# Patient Record
Sex: FEMALE | Race: WHITE | NOT HISPANIC OR LATINO | Employment: OTHER | URBAN - METROPOLITAN AREA
[De-identification: names, ages, dates, MRNs, and addresses within clinical notes are randomized per-mention and may not be internally consistent; named-entity substitution may affect disease eponyms.]

---

## 2023-04-07 LAB
THYROPEROXIDASE AB (IU/ML) IN SER/PLAS: <28 IU/ML
THYROTROPIN (MIU/L) IN SER/PLAS BY DETECTION LIMIT <= 0.05 MIU/L: 3.72 MIU/L (ref 0.44–3.98)
THYROXINE (T4) FREE (NG/DL) IN SER/PLAS: 0.75 NG/DL (ref 0.61–1.12)
TRIIODOTHYRONINE (T3) FREE (PG/ML) IN SER/PLAS: 2.9 PG/ML (ref 2.3–4.2)

## 2023-04-10 LAB
COPPER: 99.6 UG/DL (ref 80–155)
THYROGLOBULIN AB (IU/ML) IN SER/PLAS: <0.9 IU/ML (ref 0–4)
THYROGLOBULIN LC-MS/MS: NORMAL NG/ML (ref 1.3–31.8)
THYROGLOBULIN: 16 NG/ML (ref 1.3–31.8)
ZINC,SERUM OR PLASMA: 88.3 UG/DL (ref 60–120)

## 2023-04-13 LAB — T3 REVERSE: 11.4 NG/DL (ref 9–27)

## 2023-07-19 LAB
ALANINE AMINOTRANSFERASE (SGPT) (U/L) IN SER/PLAS: 14 U/L (ref 7–45)
ALBUMIN (G/DL) IN SER/PLAS: 4.5 G/DL (ref 3.4–5)
ALKALINE PHOSPHATASE (U/L) IN SER/PLAS: 61 U/L (ref 33–110)
ANION GAP IN SER/PLAS: 12 MMOL/L (ref 10–20)
ASPARTATE AMINOTRANSFERASE (SGOT) (U/L) IN SER/PLAS: 15 U/L (ref 9–39)
BASOPHILS (10*3/UL) IN BLOOD BY AUTOMATED COUNT: 0.04 X10E9/L (ref 0–0.1)
BASOPHILS/100 LEUKOCYTES IN BLOOD BY AUTOMATED COUNT: 0.9 % (ref 0–2)
BILIRUBIN TOTAL (MG/DL) IN SER/PLAS: 0.5 MG/DL (ref 0–1.2)
CALCIUM (MG/DL) IN SER/PLAS: 9.3 MG/DL (ref 8.6–10.6)
CARBON DIOXIDE, TOTAL (MMOL/L) IN SER/PLAS: 27 MMOL/L (ref 21–32)
CHLORIDE (MMOL/L) IN SER/PLAS: 107 MMOL/L (ref 98–107)
CREATININE (MG/DL) IN SER/PLAS: 0.81 MG/DL (ref 0.5–1.05)
EOSINOPHILS (10*3/UL) IN BLOOD BY AUTOMATED COUNT: 0.05 X10E9/L (ref 0–0.7)
EOSINOPHILS/100 LEUKOCYTES IN BLOOD BY AUTOMATED COUNT: 1.1 % (ref 0–6)
ERYTHROCYTE DISTRIBUTION WIDTH (RATIO) BY AUTOMATED COUNT: 12.7 % (ref 11.5–14.5)
ERYTHROCYTE MEAN CORPUSCULAR HEMOGLOBIN CONCENTRATION (G/DL) BY AUTOMATED: 33.9 G/DL (ref 32–36)
ERYTHROCYTE MEAN CORPUSCULAR VOLUME (FL) BY AUTOMATED COUNT: 96 FL (ref 80–100)
ERYTHROCYTES (10*6/UL) IN BLOOD BY AUTOMATED COUNT: 4.4 X10E12/L (ref 4–5.2)
GFR FEMALE: 89 ML/MIN/1.73M2
GLUCOSE (MG/DL) IN SER/PLAS: 80 MG/DL (ref 74–99)
HEMATOCRIT (%) IN BLOOD BY AUTOMATED COUNT: 42.2 % (ref 36–46)
HEMOGLOBIN (G/DL) IN BLOOD: 14.3 G/DL (ref 12–16)
IMMATURE GRANULOCYTES/100 LEUKOCYTES IN BLOOD BY AUTOMATED COUNT: 0.4 % (ref 0–0.9)
LEUKOCYTES (10*3/UL) IN BLOOD BY AUTOMATED COUNT: 4.7 X10E9/L (ref 4.4–11.3)
LYMPHOCYTES (10*3/UL) IN BLOOD BY AUTOMATED COUNT: 1.28 X10E9/L (ref 1.2–4.8)
LYMPHOCYTES/100 LEUKOCYTES IN BLOOD BY AUTOMATED COUNT: 27.4 % (ref 13–44)
MONOCYTES (10*3/UL) IN BLOOD BY AUTOMATED COUNT: 0.38 X10E9/L (ref 0.1–1)
MONOCYTES/100 LEUKOCYTES IN BLOOD BY AUTOMATED COUNT: 8.1 % (ref 2–10)
NEUTROPHILS (10*3/UL) IN BLOOD BY AUTOMATED COUNT: 2.9 X10E9/L (ref 1.2–7.7)
NEUTROPHILS/100 LEUKOCYTES IN BLOOD BY AUTOMATED COUNT: 62.1 % (ref 40–80)
NRBC (PER 100 WBCS) BY AUTOMATED COUNT: 0 /100 WBC (ref 0–0)
PLATELETS (10*3/UL) IN BLOOD AUTOMATED COUNT: 237 X10E9/L (ref 150–450)
POTASSIUM (MMOL/L) IN SER/PLAS: 4.7 MMOL/L (ref 3.5–5.3)
PROTEIN TOTAL: 6.5 G/DL (ref 6.4–8.2)
SODIUM (MMOL/L) IN SER/PLAS: 141 MMOL/L (ref 136–145)
THYROTROPIN (MIU/L) IN SER/PLAS BY DETECTION LIMIT <= 0.05 MIU/L: 2.91 MIU/L (ref 0.44–3.98)
THYROXINE (T4) FREE (NG/DL) IN SER/PLAS: 1.05 NG/DL (ref 0.78–1.48)
TRIIODOTHYRONINE (T3) FREE (PG/ML) IN SER/PLAS: 3 PG/ML (ref 2.3–4.2)
UREA NITROGEN (MG/DL) IN SER/PLAS: 12 MG/DL (ref 6–23)

## 2023-07-24 LAB — KIT D816V INTERPRETATION: NOT DETECTED

## 2023-07-25 LAB
T3 REVERSE: 11.1 NG/DL (ref 9–27)
TRYPTASE: 18.9 MCG/L

## 2023-09-27 LAB
CREATININE CONCENTRATION, 24 HR, U: 69 MG/DL
CREATININE, 24 HR, U: 1397 MG/24 H (ref 603–1783)
LEUKOTRIENE E4, U: 96 PG/MG CR
Lab: 24 H
URINE VOLUME: 2025 ML

## 2023-10-02 LAB — TRYPTASE: 20.4 MCG/L

## 2023-10-03 PROBLEM — H04.123 DRY EYE SYNDROME OF BOTH LACRIMAL GLANDS: Status: ACTIVE | Noted: 2023-10-03

## 2023-10-03 PROBLEM — H52.13 MYOPIA OF BOTH EYES: Status: ACTIVE | Noted: 2023-10-03

## 2023-10-03 PROBLEM — H01.006 BLEPHARITIS OF BOTH EYES: Status: ACTIVE | Noted: 2023-10-03

## 2023-10-03 PROBLEM — H01.003 BLEPHARITIS OF BOTH EYES: Status: ACTIVE | Noted: 2023-10-03

## 2023-10-03 PROBLEM — N76.4 VULVAR ABSCESS: Status: ACTIVE | Noted: 2023-10-03

## 2023-10-03 PROBLEM — H16.223 KERATOCONJUNCTIVITIS SICCA OF BOTH EYES NOT DUE TO SJOGREN'S SYNDROME: Status: ACTIVE | Noted: 2023-10-03

## 2023-10-03 RX ORDER — VIT C/E/ZN/COPPR/LUTEIN/ZEAXAN 250MG-90MG
CAPSULE ORAL
COMMUNITY

## 2023-10-03 RX ORDER — IBUPROFEN 200 MG
TABLET ORAL
COMMUNITY
End: 2023-12-27 | Stop reason: ALTCHOICE

## 2023-10-03 RX ORDER — HYDROCODONE BITARTRATE AND ACETAMINOPHEN 5; 300 MG/1; MG/1
1 TABLET ORAL EVERY 6 HOURS PRN
COMMUNITY
Start: 2015-01-13 | End: 2023-11-07 | Stop reason: ALTCHOICE

## 2023-10-03 RX ORDER — EPINEPHRINE 0.15 MG/.15ML
0.15 INJECTION SUBCUTANEOUS AS NEEDED
COMMUNITY
Start: 2022-07-28 | End: 2023-11-07 | Stop reason: SDUPTHER

## 2023-10-04 ENCOUNTER — CLINICAL SUPPORT (OUTPATIENT)
Dept: ALLERGY | Facility: CLINIC | Age: 48
End: 2023-10-04
Payer: COMMERCIAL

## 2023-10-04 DIAGNOSIS — Z91.09 OTHER ALLERGY, OTHER THAN TO MEDICINAL AGENTS: ICD-10-CM

## 2023-10-04 DIAGNOSIS — J30.89 NON-SEASONAL ALLERGIC RHINITIS, UNSPECIFIED TRIGGER: ICD-10-CM

## 2023-10-04 DIAGNOSIS — J30.9 ALLERGIC RHINITIS, UNSPECIFIED SEASONALITY, UNSPECIFIED TRIGGER: ICD-10-CM

## 2023-10-04 DIAGNOSIS — J30.81 ALLERGIC RHINITIS DUE TO ANIMAL (CAT) (DOG) HAIR AND DANDER: ICD-10-CM

## 2023-10-04 PROCEDURE — 95117 IMMUNOTHERAPY INJECTIONS: CPT | Performed by: ALLERGY & IMMUNOLOGY

## 2023-10-09 LAB
MISCELLANEUOUS TEST RESULT: NORMAL
NAME OF SENDOUT TEST: NORMAL

## 2023-10-11 ENCOUNTER — APPOINTMENT (OUTPATIENT)
Dept: ALLERGY | Facility: CLINIC | Age: 48
End: 2023-10-11
Payer: COMMERCIAL

## 2023-10-18 ENCOUNTER — CLINICAL SUPPORT (OUTPATIENT)
Dept: ALLERGY | Facility: CLINIC | Age: 48
End: 2023-10-18
Payer: COMMERCIAL

## 2023-10-18 ENCOUNTER — HOSPITAL ENCOUNTER (EMERGENCY)
Facility: HOSPITAL | Age: 48
Discharge: HOME | End: 2023-10-18
Attending: STUDENT IN AN ORGANIZED HEALTH CARE EDUCATION/TRAINING PROGRAM
Payer: COMMERCIAL

## 2023-10-18 VITALS
TEMPERATURE: 97.9 F | RESPIRATION RATE: 16 BRPM | HEART RATE: 82 BPM | BODY MASS INDEX: 21.7 KG/M2 | HEIGHT: 71 IN | OXYGEN SATURATION: 99 % | WEIGHT: 155 LBS | SYSTOLIC BLOOD PRESSURE: 113 MMHG | DIASTOLIC BLOOD PRESSURE: 87 MMHG

## 2023-10-18 DIAGNOSIS — J30.9 ALLERGIC RHINITIS, UNSPECIFIED SEASONALITY, UNSPECIFIED TRIGGER: ICD-10-CM

## 2023-10-18 DIAGNOSIS — J30.81 ALLERGIC RHINITIS DUE TO ANIMAL (CAT) (DOG) HAIR AND DANDER: ICD-10-CM

## 2023-10-18 DIAGNOSIS — T63.441A ALLERGIC REACTION TO BEE STING: Primary | ICD-10-CM

## 2023-10-18 DIAGNOSIS — J30.89 NON-SEASONAL ALLERGIC RHINITIS, UNSPECIFIED TRIGGER: ICD-10-CM

## 2023-10-18 PROCEDURE — 2500000002 HC RX 250 W HCPCS SELF ADMINISTERED DRUGS (ALT 637 FOR MEDICARE OP, ALT 636 FOR OP/ED): Performed by: HEALTH CARE PROVIDER

## 2023-10-18 PROCEDURE — 96375 TX/PRO/DX INJ NEW DRUG ADDON: CPT

## 2023-10-18 PROCEDURE — 95117 IMMUNOTHERAPY INJECTIONS: CPT | Performed by: ALLERGY & IMMUNOLOGY

## 2023-10-18 PROCEDURE — 96372 THER/PROPH/DIAG INJ SC/IM: CPT

## 2023-10-18 PROCEDURE — 99284 EMERGENCY DEPT VISIT MOD MDM: CPT | Mod: 25 | Performed by: STUDENT IN AN ORGANIZED HEALTH CARE EDUCATION/TRAINING PROGRAM

## 2023-10-18 PROCEDURE — 96374 THER/PROPH/DIAG INJ IV PUSH: CPT

## 2023-10-18 PROCEDURE — 2500000004 HC RX 250 GENERAL PHARMACY W/ HCPCS (ALT 636 FOR OP/ED): Performed by: HEALTH CARE PROVIDER

## 2023-10-18 PROCEDURE — 94640 AIRWAY INHALATION TREATMENT: CPT

## 2023-10-18 PROCEDURE — 2500000004 HC RX 250 GENERAL PHARMACY W/ HCPCS (ALT 636 FOR OP/ED)

## 2023-10-18 RX ORDER — DIPHENHYDRAMINE HYDROCHLORIDE 50 MG/ML
50 INJECTION INTRAMUSCULAR; INTRAVENOUS ONCE
Status: COMPLETED | OUTPATIENT
Start: 2023-10-18 | End: 2023-10-18

## 2023-10-18 RX ORDER — IPRATROPIUM BROMIDE AND ALBUTEROL SULFATE 2.5; .5 MG/3ML; MG/3ML
3 SOLUTION RESPIRATORY (INHALATION) ONCE
Status: COMPLETED | OUTPATIENT
Start: 2023-10-18 | End: 2023-10-18

## 2023-10-18 RX ORDER — PREDNISONE 20 MG/1
40 TABLET ORAL DAILY
Qty: 10 TABLET | Refills: 0 | Status: SHIPPED | OUTPATIENT
Start: 2023-10-18 | End: 2023-10-23

## 2023-10-18 RX ORDER — DIPHENHYDRAMINE HYDROCHLORIDE 50 MG/ML
INJECTION INTRAMUSCULAR; INTRAVENOUS
Status: COMPLETED
Start: 2023-10-18 | End: 2023-10-18

## 2023-10-18 RX ORDER — EPINEPHRINE 1 MG/ML
0.3 INJECTION INTRAMUSCULAR; INTRAVENOUS; SUBCUTANEOUS ONCE
Status: COMPLETED | OUTPATIENT
Start: 2023-10-18 | End: 2023-10-18

## 2023-10-18 RX ORDER — FAMOTIDINE 10 MG/ML
20 INJECTION INTRAVENOUS ONCE
Status: COMPLETED | OUTPATIENT
Start: 2023-10-18 | End: 2023-10-18

## 2023-10-18 RX ADMIN — DIPHENHYDRAMINE HYDROCHLORIDE 50 MG: 50 INJECTION INTRAMUSCULAR; INTRAVENOUS at 15:41

## 2023-10-18 RX ADMIN — IPRATROPIUM BROMIDE AND ALBUTEROL SULFATE 3 ML: .5; 3 SOLUTION RESPIRATORY (INHALATION) at 16:07

## 2023-10-18 RX ADMIN — FAMOTIDINE 20 MG: 10 INJECTION, SOLUTION INTRAVENOUS at 15:40

## 2023-10-18 RX ADMIN — DIPHENHYDRAMINE HYDROCHLORIDE 50 MG: 50 INJECTION, SOLUTION INTRAMUSCULAR; INTRAVENOUS at 15:41

## 2023-10-18 RX ADMIN — EPINEPHRINE 0.3 MG: 1 INJECTION INTRAMUSCULAR; INTRAVENOUS; SUBCUTANEOUS at 16:19

## 2023-10-18 RX ADMIN — METHYLPREDNISOLONE SODIUM SUCCINATE 125 MG: 125 INJECTION, POWDER, FOR SOLUTION INTRAMUSCULAR; INTRAVENOUS at 15:41

## 2023-10-18 ASSESSMENT — PAIN - FUNCTIONAL ASSESSMENT: PAIN_FUNCTIONAL_ASSESSMENT: 0-10

## 2023-10-18 ASSESSMENT — PAIN SCALES - GENERAL: PAINLEVEL_OUTOF10: 0 - NO PAIN

## 2023-10-18 ASSESSMENT — COLUMBIA-SUICIDE SEVERITY RATING SCALE - C-SSRS
1. IN THE PAST MONTH, HAVE YOU WISHED YOU WERE DEAD OR WISHED YOU COULD GO TO SLEEP AND NOT WAKE UP?: NO
2. HAVE YOU ACTUALLY HAD ANY THOUGHTS OF KILLING YOURSELF?: NO
6. HAVE YOU EVER DONE ANYTHING, STARTED TO DO ANYTHING, OR PREPARED TO DO ANYTHING TO END YOUR LIFE?: NO

## 2023-10-18 ASSESSMENT — LIFESTYLE VARIABLES
HAVE PEOPLE ANNOYED YOU BY CRITICIZING YOUR DRINKING: NO
EVER HAD A DRINK FIRST THING IN THE MORNING TO STEADY YOUR NERVES TO GET RID OF A HANGOVER: NO
REASON UNABLE TO ASSESS: YES
HAVE YOU EVER FELT YOU SHOULD CUT DOWN ON YOUR DRINKING: NO
EVER FELT BAD OR GUILTY ABOUT YOUR DRINKING: NO

## 2023-10-18 NOTE — ED PROVIDER NOTES
HPI   Chief Complaint   Patient presents with    Insect Bite     Pt presents to the ER with a bee sting to mouth. Pt has an allergy to bees. PT states that she feels funny. PT has a rash to her face and neck and states that her throat is itchy.        CC: allergic reaction   HPI:   48-year-old female presents ED after she was stung by a bee prior to arrival patient reports prior anaphylactic reaction secondary to bee stings she did not give herself her EpiPen she reports feeling flushed, hot, tingling sensation swelling around the lips and tongue she also notes some tightness sensation in the throat.  She denies any shortness of breath denies any nausea vomiting denies any chest pain    Additional Limitations to History:   External Records Reviewed: I reviewed recent and relevant outside records including   History Obtained From:     Past Medical History: Per HPI  Medications: Reviewed in EMR and with patient  Allergies:  Reviewed in EMR  Past Surgical History:   Social History:     ------------------------------------------------------------------------------------------------------  Physical Exam:  --Vital signs reviewed in nursing triage note, EMR flow sheets, and at patient's bedside  GEN:  A&Ox3, no acute distress, appears comfortable.  Conversational and appropriate.  No confusion or gross mental status changes.  EYES: EOMI, non-injected sclera.  ENT: Moist mucous membranes, no apparent injuries or lesions.   CARDIO: Normal rate and regular rhythm. No murmurs, rubs, or gallops.  2+ equal pulses of the distal extremities.   PULM: Clear to auscultation bilaterally. No rales, rhonchi, or wheezes. Good symmetric chest expansion.  GI: Soft, non-tender, non-distended. No rebound tenderness or guarding.  SKIN: Warm and dry, no rashes or lesions.  MSK: ROM intact the extremities without contractures.   EXT: No peripheral edema, contusions, or wounds.   NEURO: Cranial nerves II-XII grossly intact. Sensation to light  touch intact and equal bilaterally in upper and lower extremities.  Symmetric 5/5 strength in upper and lower extremities.  PSYCH: Appropriate mood and behavior, converses and responds appropriately during exam.  -------------------------------------------------------------------------------------------------------    Medical Decision Making:  Patient seen and evaluated by ED attending. On arrival the patient     Differential Diagnoses Considered:   Chronic Medical Conditions Significantly Affecting Care:   Diagnostic testing considered: [PERC, D-Dimer, PECARN, etc.]    - EKG interpreted by myself normal sinus rhythm ventricular rate 80 OR interval 148 normal QRS duration no prolonged QT/QTc  - I independently interpreted: [CXR, CT, POCUS, etc. including your interpretation]  - Labs notable for     Escalation of Care: Appropriate for   Social Determinants of Health Significantly Affecting Care: [Homelessness, lacking transportation, uninsured, unable to afford medications]  Prescription Drug Consideration: [Antibiotics, antivirals, pain medications, etc.]  Discussion of Management with Other Providers:  I discussed the patient/results with: [admitting team, consultant, radiologist, social work, EPAT, case management, PT/OT, RT, PCP, etc.]      Delio Espinoza PA-C                          [place] Coma Scale Score: 15                  Patient History   Past Medical History:   Diagnosis Date    Allergy status to unspecified drugs, medicaments and biological substances     History of allergy    Other conditions influencing health status     History Of ___ Previous Pregnancies    Ovarian cyst     Ovarian cyst     No past surgical history on file.  Family History   Problem Relation Name Age of Onset    Melanoma Mother      Lymphoma Sister      Lung cancer Maternal Grandmother      Lung cancer Paternal Grandfather       Social History     Tobacco Use    Smoking status: Not on file    Smokeless tobacco: Not on file    Substance Use Topics    Alcohol use: Not on file    Drug use: Not on file       Physical Exam   ED Triage Vitals [10/18/23 1517]   Temp Heart Rate Resp BP   36.7 °C (98.1 °F) 100 21 110/80      SpO2 Temp Source Heart Rate Source Patient Position   100 % Tympanic Monitor Sitting      BP Location FiO2 (%)     Left arm --       Physical Exam    ED Course & MDM   Diagnoses as of 10/18/23 1927   Allergic reaction to bee sting       Medical Decision Making  Patient was given 0.3 mg epinephrine IM, DuoNeb, Benadryl 50 mg, Pepcid, Solu-Medrol, patient was observed for approximately 4 hours and notes feeling moderately better symptoms almost completely resolved she is neurologically intact hemodynamically stable nontoxic        Procedure  Procedures    The patient was seen by the advanced practice provider.  I have personally performed a substantive portion of the encounter.  I have seen and examined the patient; agree with the workup, evaluation, MDM, management and diagnosis.  The care plan has been discussed with the resident/fellow; I have reviewed the resident/fellow’s note and agree with the documented findings with the exception/addition of information listed below.  All notation in this Addendum supersedes information presented by the resident or YAYA as listed above.     Pt presented for evaluation of a bee sting, s/p self administered epipen.  On my exam, the patient appears comfortable, speaking in full sentences without any visible intraoral swelling.  She was given medication for anaphylaxis and monitored in the ED for 4 hours without rebound reaction.  She does mention a history of rebound reaction and knows the signs and symptoms to look out for.  She does have an epipen at home.  She was ultimately discharged in stable condition.         Rosy Wood DO  Emergency Medicine       Rosy Wood DO  11/02/23 1239      Critical care Time 30 min  This critically ill patient continues to be at-risk  for deterioration / failure due to the above  mentioned dysfunctional unstable organ systems.  I have personally identified and managed all critical care issues.  Assessment, impressions and plans are reflected in the note above as well as the orders.  Critical care time is spent at bedside includes review of diagnostic tests, labs, and radiographs, serial assessments and management of hemodynamics, respiratory status, ventilation and coordination of care.       Rosy Wood, DO  11/30/23 0117

## 2023-10-25 ENCOUNTER — APPOINTMENT (OUTPATIENT)
Dept: ALLERGY | Facility: CLINIC | Age: 48
End: 2023-10-25
Payer: COMMERCIAL

## 2023-11-01 ENCOUNTER — APPOINTMENT (OUTPATIENT)
Dept: ALLERGY | Facility: CLINIC | Age: 48
End: 2023-11-01
Payer: COMMERCIAL

## 2023-11-03 DIAGNOSIS — M79.645 FINGER PAIN, LEFT: ICD-10-CM

## 2023-11-06 ENCOUNTER — HOSPITAL ENCOUNTER (OUTPATIENT)
Dept: RADIOLOGY | Facility: HOSPITAL | Age: 48
Discharge: HOME | End: 2023-11-06
Payer: COMMERCIAL

## 2023-11-06 ENCOUNTER — OFFICE VISIT (OUTPATIENT)
Dept: ORTHOPEDIC SURGERY | Facility: CLINIC | Age: 48
End: 2023-11-06
Payer: COMMERCIAL

## 2023-11-06 VITALS — HEIGHT: 71 IN | BODY MASS INDEX: 21.7 KG/M2 | WEIGHT: 155 LBS

## 2023-11-06 DIAGNOSIS — M79.645 FINGER PAIN, LEFT: ICD-10-CM

## 2023-11-06 DIAGNOSIS — S63.639A VOLAR PLATE INJURY OF FINGER, INITIAL ENCOUNTER: Primary | ICD-10-CM

## 2023-11-06 PROCEDURE — 1036F TOBACCO NON-USER: CPT | Performed by: ORTHOPAEDIC SURGERY

## 2023-11-06 PROCEDURE — 99203 OFFICE O/P NEW LOW 30 MIN: CPT | Performed by: ORTHOPAEDIC SURGERY

## 2023-11-06 PROCEDURE — 73140 X-RAY EXAM OF FINGER(S): CPT | Mod: LEFT SIDE | Performed by: STUDENT IN AN ORGANIZED HEALTH CARE EDUCATION/TRAINING PROGRAM

## 2023-11-06 PROCEDURE — 73140 X-RAY EXAM OF FINGER(S): CPT | Mod: LT

## 2023-11-06 ASSESSMENT — PAIN - FUNCTIONAL ASSESSMENT: PAIN_FUNCTIONAL_ASSESSMENT: NO/DENIES PAIN

## 2023-11-07 NOTE — PROGRESS NOTES
History of Present Illness:  Chief Complaint   Patient presents with    Left Hand - Pain     Left ring finger pain        Patient presents today for evaluation of left ring finger PIP joint pain that began after a fall in June 2023.  She braced her fall with her left hand and believes that she hyperextended her ring finger.  She was still able to move the finger, but noted some degree of swelling.  When she had persistent pain after a couple months radiographs were taken of her left hand in August that were read as negative.  She continues to have some aching soreness about the PIP joint as well as some residual swelling.    Past Medical History:   Diagnosis Date    Allergy status to unspecified drugs, medicaments and biological substances     History of allergy    Other conditions influencing health status     History Of ___ Previous Pregnancies    Ovarian cyst     Ovarian cyst       Medication Documentation Review Audit       Reviewed by Justyn Devi MD (Physician) on 11/07/23 at 0658      Medication Order Taking? Sig Documenting Provider Last Dose Status   cholecalciferol (Vitamin D-3) 25 MCG (1000 UT) capsule [id number] No Take by mouth. Todd Lott MD Not Taking Active   cyanocobalamin, vitamin B-12, (VITAMIN B-12 ORAL) [id number] No Take by mouth. Todd Lott MD Not Taking Active   docosahexaenoic acid/epa (FISH OIL ORAL) [id number] No Fish Oil CAPS   Refills: 0       Active Todd Lott MD Not Taking Active   Discontinued 11/07/23 0657   EPINEPHrine 0.3 mg/0.3 mL injection syringe [id number] No Inject into upper leg. Call 911 after use. Todd Lott MD Not Taking Active   Discontinued 11/07/23 0658   lactobacillus acidophilus (Acidophilus) capsule [id number] No Take by mouth. Todd Lott MD Not Taking Active                    Allergies   Allergen Reactions    Bee Venom Protein (Honey Bee) Hives       Social History     Socioeconomic History    Marital status:       Spouse name: Not on file    Number of children: Not on file    Years of education: Not on file    Highest education level: Not on file   Occupational History    Not on file   Tobacco Use    Smoking status: Never    Smokeless tobacco: Never   Vaping Use    Vaping Use: Never used   Substance and Sexual Activity    Alcohol use: Never    Drug use: Never    Sexual activity: Defer   Other Topics Concern    Not on file   Social History Narrative    Not on file     Social Determinants of Health     Financial Resource Strain: Not on file   Food Insecurity: Not on file   Transportation Needs: Not on file   Physical Activity: Not on file   Stress: Not on file   Social Connections: Not on file   Intimate Partner Violence: Not on file   Housing Stability: Not on file       History reviewed. No pertinent surgical history.     Review of Systems   GENERAL: Negative for malaise, significant weight loss, fever  MUSCULOSKELETAL: see HPI  NEURO:  Negative     Physical Examination  Constitutional: Appears well-developed and well-nourished.  Head: Normocephalic and atraumatic.  Eyes: EOMI grossly  Cardiovascular: Intact distal pulses.   Respiratory: Effort normal. No respiratory distress.  Neurologic: Alert and oriented to person, place, and time.  Skin: Skin is warm and dry.  Hematologic / Lymphatic: No lymphedema, lymphangitis.  Psychiatric: normal mood and affect. Behavior is normal.   Musculoskeletal:  Left ring finger: Mild edema about PIP joint.  Positive volar plate test.  No gross instability.  5-105 degrees flexion.  Sensation intact distally.  Capillary refill less than 2 seconds.    Radiographs: Left ring finger radiographs ordered and available for my review/interpretation: No fracture/dislocation.  Joint spaces maintained.  Review of previous left hand radiographs from August 2, 2023 demonstrates slight irregularity about volar plate insertion of ring finger.     Assessment:  Patient with likely left ring finger  volar plate injury with some residual swelling/pain     Plan:  Nature of the diagnosis was discussed with the patient.  We discussed typical recovery course as well as the fact that she may have long-term PIP joint fullness relative to other joints.  Recommend continued mobilization.  We reviewed a terminal extension exercises that she can do to help with the last few degrees of extension.  She will return if any persistent issues or concerns.  Could also consider therapy referral if she remains symptomatic.

## 2023-11-08 ENCOUNTER — APPOINTMENT (OUTPATIENT)
Dept: ALLERGY | Facility: CLINIC | Age: 48
End: 2023-11-08
Payer: COMMERCIAL

## 2023-11-09 ENCOUNTER — PATIENT OUTREACH (OUTPATIENT)
Dept: HEMATOLOGY/ONCOLOGY | Facility: HOSPITAL | Age: 48
End: 2023-11-09
Payer: COMMERCIAL

## 2023-11-09 NOTE — PROGRESS NOTES
RN talked to patient. She said that she was referred by Dr. Vanessa (allergist) for elevated tryptase level. Patient instructed on date, time and place of appointment. Understanding voiced. Patient to call for any questions. Call back instructions reviewed.

## 2023-11-14 ENCOUNTER — APPOINTMENT (OUTPATIENT)
Dept: OPHTHALMOLOGY | Facility: CLINIC | Age: 48
End: 2023-11-14
Payer: COMMERCIAL

## 2023-11-15 ENCOUNTER — APPOINTMENT (OUTPATIENT)
Dept: ALLERGY | Facility: CLINIC | Age: 48
End: 2023-11-15
Payer: COMMERCIAL

## 2023-11-15 ENCOUNTER — OFFICE VISIT (OUTPATIENT)
Dept: ALLERGY | Facility: CLINIC | Age: 48
End: 2023-11-15
Payer: COMMERCIAL

## 2023-11-15 VITALS
HEART RATE: 89 BPM | OXYGEN SATURATION: 98 % | HEIGHT: 71 IN | SYSTOLIC BLOOD PRESSURE: 112 MMHG | BODY MASS INDEX: 22.54 KG/M2 | WEIGHT: 161 LBS | DIASTOLIC BLOOD PRESSURE: 74 MMHG

## 2023-11-15 DIAGNOSIS — T63.91XA TOXIC EFFECT OF VENOM, ACCIDENTAL OR UNINTENTIONAL, INITIAL ENCOUNTER: ICD-10-CM

## 2023-11-15 DIAGNOSIS — J30.9 ALLERGIC RHINITIS, UNSPECIFIED SEASONALITY, UNSPECIFIED TRIGGER: ICD-10-CM

## 2023-11-15 DIAGNOSIS — J30.81 ALLERGIC RHINITIS DUE TO ANIMAL (CAT) (DOG) HAIR AND DANDER: Primary | ICD-10-CM

## 2023-11-15 PROCEDURE — 95117 IMMUNOTHERAPY INJECTIONS: CPT | Performed by: ALLERGY & IMMUNOLOGY

## 2023-11-15 PROCEDURE — 99214 OFFICE O/P EST MOD 30 MIN: CPT | Performed by: ALLERGY & IMMUNOLOGY

## 2023-11-15 PROCEDURE — 1036F TOBACCO NON-USER: CPT | Performed by: ALLERGY & IMMUNOLOGY

## 2023-11-15 NOTE — PROGRESS NOTES
"Subjective   Patient ID:   [id number]   Qi Pearce is a 48 y.o. female who presents for Allergies (Patient has been sick and she got sting by a yellow jacket having concerns about getting allergy shot).    Chief Complaint   Patient presents with    Allergies     Patient has been sick and she got sting by a yellow jacket having concerns about getting allergy shot          HPI  This patient is here to evaluate for:    10/18/23 - drinking  She was gardening  Stung on the tongue  Had hot chocolate with spices. She took a drink and got stung.   Had swelling. Flushing.   Went to the ED  Had her epipen but gets disoriented.    Gave her benadryl, prednisone, and did give her epinephrine  Benadryl, famotidine, medrol 3:41pm  [place] throat closing so Epi 4:19pm    As soon as she got epinephrine, she felt better.     Then she got a virus  Covid/mono/flu negative.     She is still exhausted   Had a sore throat, sick contact with her daughter. Started 3 weeks ago.  Sinus cough, congestion. Reminded her of when she had covid in the past.   Improving.  Dr. Oconnor in Dr. Cline's office; they had suggested abx but discussed with Dr. Asia Morgan. They decided to avoid abx and use:  Steroid inhaler given, budesonide for the nebulizer and flonase.     When she gets allergy shots, has swelling    She feels like she's becoming more allergic     And headaches.        SH: has a 11yo daughter    Review of Systems   All other systems reviewed and are negative.      Objective     /74   Pulse 89   Ht 1.803 m (5' 11\")   Wt 73 kg (161 lb)   SpO2 98%   BMI 22.45 kg/m²      Physical Exam  Vitals and nursing note reviewed.   Constitutional:       Appearance: Normal appearance. She is normal weight.   HENT:      Head: Normocephalic and atraumatic.      Right Ear: External ear normal.      Left Ear: External ear normal.      Nose: No septal deviation, congestion or rhinorrhea.      Right Turbinates: Enlarged and swollen. Not pale.    "   Left Turbinates: Not enlarged, swollen or pale.      Comments: Mild clear mucus on right but no purulence seen;   Mouth - no erythema or drainage     Mouth/Throat:      Mouth: Mucous membranes are moist.   Eyes:      Extraocular Movements: Extraocular movements intact.      Conjunctiva/sclera: Conjunctivae normal.      Pupils: Pupils are equal, round, and reactive to light.   Cardiovascular:      Rate and Rhythm: Normal rate and regular rhythm.      Pulses: Normal pulses.      Heart sounds: Normal heart sounds.   Pulmonary:      Effort: Pulmonary effort is normal.      Breath sounds: Normal breath sounds. No wheezing, rhonchi or rales.   Musculoskeletal:         General: Normal range of motion.   Skin:     General: Skin is warm and dry.      Findings: No erythema or rash.   Neurological:      General: No focal deficit present.      Mental Status: She is alert and oriented to person, place, and time.   Psychiatric:         Mood and Affect: Mood normal.         Behavior: Behavior normal.            Current Outpatient Medications   Medication Sig Dispense Refill    cholecalciferol (Vitamin D-3) 25 MCG (1000 UT) capsule Take by mouth.      cyanocobalamin, vitamin B-12, (VITAMIN B-12 ORAL) Take by mouth.      docosahexaenoic acid/epa (FISH OIL ORAL) Fish Oil CAPS   Refills: 0       Active      EPINEPHrine 0.3 mg/0.3 mL injection syringe Inject into upper leg. Call 911 after use.      lactobacillus acidophilus (Acidophilus) capsule Take by mouth.       No current facility-administered medications for this visit.       Assessment/Plan   Diagnoses and all orders for this visit:  Allergic rhinitis due to animal (cat) (dog) hair and dander  Allergic rhinitis, unspecified seasonality, unspecified trigger  Toxic effect of venom, accidental or unintentional, initial encounter      Will avoid environmental and venom shots on same day  So for 3 weeks in a row, doing environmentals   4th week - venom  Repeat    We discussed  whether to INCREASE the venom dosing due to her recent reaction. In shared decision making, since she just recently got to maintenance and the sting was located on her tongue (very vascular and associated with throat angioedema); for now will continue the current 1ml dosing. Will reassess going forward.     Follow-up in 3 months so we may re-assess you and develop a more long term plan.     Allergy shot today given for enviornmental; next week will do the 3 venoms (mv,w,hb)    Await heme/onc recommendations - seeing Dr. Archer on 11/20/23    Naren Vanessa MD

## 2023-11-20 ENCOUNTER — OFFICE VISIT (OUTPATIENT)
Dept: HEMATOLOGY/ONCOLOGY | Facility: HOSPITAL | Age: 48
End: 2023-11-20
Payer: COMMERCIAL

## 2023-11-20 ENCOUNTER — LAB (OUTPATIENT)
Dept: LAB | Facility: HOSPITAL | Age: 48
End: 2023-11-20
Payer: COMMERCIAL

## 2023-11-20 ENCOUNTER — OFFICE VISIT (OUTPATIENT)
Dept: OPHTHALMOLOGY | Facility: CLINIC | Age: 48
End: 2023-11-20
Payer: COMMERCIAL

## 2023-11-20 VITALS
HEART RATE: 84 BPM | OXYGEN SATURATION: 100 % | HEIGHT: 70 IN | RESPIRATION RATE: 17 BRPM | TEMPERATURE: 97.5 F | SYSTOLIC BLOOD PRESSURE: 110 MMHG | WEIGHT: 161.1 LBS | DIASTOLIC BLOOD PRESSURE: 73 MMHG | BODY MASS INDEX: 23.06 KG/M2

## 2023-11-20 DIAGNOSIS — R74.8 ELEVATED SERUM TRYPTASE: ICD-10-CM

## 2023-11-20 DIAGNOSIS — R74.8 ELEVATED SERUM TRYPTASE: Primary | ICD-10-CM

## 2023-11-20 DIAGNOSIS — H53.8 BLURRY VISION: Primary | ICD-10-CM

## 2023-11-20 DIAGNOSIS — H53.10 SUBJECTIVE VISUAL DISTURBANCE: ICD-10-CM

## 2023-11-20 LAB
AVERAGE RNFL BASELINE (OD): 79
AVERAGE RNFL BASELINE (OS): 78
BASOPHILS # BLD AUTO: 0.03 X10*3/UL (ref 0–0.1)
BASOPHILS NFR BLD AUTO: 0.4 %
EOSINOPHIL # BLD AUTO: 0 X10*3/UL (ref 0–0.7)
EOSINOPHIL NFR BLD AUTO: 0 %
ERYTHROCYTE [DISTWIDTH] IN BLOOD BY AUTOMATED COUNT: 12.5 % (ref 11.5–14.5)
HCT VFR BLD AUTO: 42.1 % (ref 36–46)
HGB BLD-MCNC: 14.6 G/DL (ref 12–16)
HOLD SPECIMEN: NORMAL
IMM GRANULOCYTES # BLD AUTO: 0.03 X10*3/UL (ref 0–0.7)
IMM GRANULOCYTES NFR BLD AUTO: 0.4 % (ref 0–0.9)
LYMPHOCYTES # BLD AUTO: 1.46 X10*3/UL (ref 1.2–4.8)
LYMPHOCYTES NFR BLD AUTO: 19.9 %
MCH RBC QN AUTO: 31.9 PG (ref 26–34)
MCHC RBC AUTO-ENTMCNC: 34.7 G/DL (ref 32–36)
MCV RBC AUTO: 92 FL (ref 80–100)
MONOCYTES # BLD AUTO: 0.45 X10*3/UL (ref 0.1–1)
MONOCYTES NFR BLD AUTO: 6.1 %
NEUTROPHILS # BLD AUTO: 5.35 X10*3/UL (ref 1.2–7.7)
NEUTROPHILS NFR BLD AUTO: 73.2 %
NRBC BLD-RTO: 0 /100 WBCS (ref 0–0)
PLATELET # BLD AUTO: 309 X10*3/UL (ref 150–450)
RBC # BLD AUTO: 4.58 X10*6/UL (ref 4–5.2)
WBC # BLD AUTO: 7.3 X10*3/UL (ref 4.4–11.3)

## 2023-11-20 PROCEDURE — 36415 COLL VENOUS BLD VENIPUNCTURE: CPT

## 2023-11-20 PROCEDURE — 83520 IMMUNOASSAY QUANT NOS NONAB: CPT

## 2023-11-20 PROCEDURE — 1036F TOBACCO NON-USER: CPT | Performed by: STUDENT IN AN ORGANIZED HEALTH CARE EDUCATION/TRAINING PROGRAM

## 2023-11-20 PROCEDURE — 92133 CPTRZD OPH DX IMG PST SGM ON: CPT | Performed by: OPTOMETRIST

## 2023-11-20 PROCEDURE — 99205 OFFICE O/P NEW HI 60 MIN: CPT | Performed by: STUDENT IN AN ORGANIZED HEALTH CARE EDUCATION/TRAINING PROGRAM

## 2023-11-20 PROCEDURE — 99215 OFFICE O/P EST HI 40 MIN: CPT | Performed by: STUDENT IN AN ORGANIZED HEALTH CARE EDUCATION/TRAINING PROGRAM

## 2023-11-20 PROCEDURE — 99213 OFFICE O/P EST LOW 20 MIN: CPT | Performed by: OPTOMETRIST

## 2023-11-20 PROCEDURE — 85025 COMPLETE CBC W/AUTO DIFF WBC: CPT

## 2023-11-20 ASSESSMENT — REFRACTION_CURRENTRX
OD_ADDL_SPECS: 02/2022
OS_DIAMETER: 9.0
OD_BRAND: ESSILOR BITORIC
OS_BRAND: ESSILOR BITORIC
OD_DIAMETER: 9.0

## 2023-11-20 ASSESSMENT — ENCOUNTER SYMPTOMS
ENDOCRINE NEGATIVE: 0
MUSCULOSKELETAL NEGATIVE: 0
LOSS OF SENSATION IN FEET: 0
NEUROLOGICAL NEGATIVE: 0
CARDIOVASCULAR NEGATIVE: 0
PSYCHIATRIC NEGATIVE: 0
ALLERGIC/IMMUNOLOGIC NEGATIVE: 0
GASTROINTESTINAL NEGATIVE: 0
HEMATOLOGIC/LYMPHATIC NEGATIVE: 0
DEPRESSION: 0
EYES NEGATIVE: 0
CONSTITUTIONAL NEGATIVE: 0
RESPIRATORY NEGATIVE: 0
OCCASIONAL FEELINGS OF UNSTEADINESS: 0

## 2023-11-20 ASSESSMENT — SLIT LAMP EXAM - LIDS
COMMENTS: NORMAL
COMMENTS: NORMAL

## 2023-11-20 ASSESSMENT — CUP TO DISC RATIO
OD_RATIO: 0.2
OS_RATIO: 0.2

## 2023-11-20 ASSESSMENT — EXTERNAL EXAM - RIGHT EYE: OD_EXAM: NORMAL

## 2023-11-20 ASSESSMENT — EXTERNAL EXAM - LEFT EYE: OS_EXAM: NORMAL

## 2023-11-20 ASSESSMENT — VISUAL ACUITY
OS_CC: 20/20
METHOD: SNELLEN - LINEAR
OS_CC+: +2
OD_CC: J1
OD_CC: 20/20
OS_CC: J1
VA_OR_OS_CURRENT_RX: 20/15
CORRECTION_TYPE: CONTACTS
OD_CC+: +2
VA_OR_OD_CURRENT_RX: 20/15

## 2023-11-20 ASSESSMENT — PAIN SCALES - GENERAL: PAINLEVEL: 0-NO PAIN

## 2023-11-20 ASSESSMENT — TONOMETRY
IOP_METHOD: TONOPEN
OD_IOP_MMHG: 11
OS_IOP_MMHG: 12

## 2023-11-20 NOTE — LETTER
November 20, 2023    Myrtle Trejo MD  [address] [facility] [address]     Patient: Qi Pearce   YOB: 1975   Date of Visit: 11/20/2023       Dear Dr. Myrtle Trejo MD:    Thank you for referring Qi Pearce to me for evaluation. Here is my assessment and plan of care:    Blurry vision  -     OCT, Optic Nerve - OU - Both Eyes  Subjective visual disturbance  Discussed. No evidence of optic disc edema OD, OS on exam or OCT RNFL. Visual acuity (VA) in habitual CLs is 20/15 and 20/20. CL OD does have a few scratches which may be contributing to subjective complaint of 'blurrier' vision. Rigid gas permeable (RGP) OD is from 02/2022 and OS is from 06/2023.   Contrast sensitivity measured normal OD, OS (was checked post dilation, would check pre-dilation at next).   RTC 6 months for comprehensive exam w/ OCT RNFL and contrast sensitivity.   F/up with PCP and allergy team    Below you will find my full exam findings. If you have questions, please do not hesitate to call me. I look forward to following Qi along with you.         Sincerely,        Vanesa RADHIKA Monique, OD        CC:   No Recipients        Base Eye Exam       Visual Acuity (Snellen - Linear)         Right Left    Dist cc 20/20 +2 20/20 +2    Near cc J1 J1      Correction: Contacts              Tonometry (Tonopen, 8:54 AM)         Right Left    Pressure 11 12              Pupils         Pupils    Right PERRL, No APD    Left PERRL, No APD              Extraocular Movement         Right Left     Full Full              Neuro/Psych       Oriented x3: Yes    Mood/Affect: Normal              Dilation       Both eyes: 1.0% Mydriacyl @ 8:55 AM                  Additional Notes    Contrast sensitivity 20/40 letter  OD:12.5  OS: 16       Slit Lamp and Fundus Exam       External Exam         Right Left    External Normal Normal              Slit Lamp Exam         Right Left    Lids/Lashes Normal Normal    Conjunctiva/Sclera White and  quiet White and quiet    Cornea Clear Clear    Anterior Chamber Deep and quiet Deep and quiet    Iris Round and reactive Round and reactive    Lens Clear Clear              Fundus Exam         Right Left    Vitreous Normal Normal    Disc Normal, distinct margins Normal, distinct margins    C/D Ratio 0.2 0.2    Macula Normal Normal                  Contact Lens Exam       Current Contact Lens Rx         Brand Base Curve Diameter Sphere Addl. Specs Over-Sphere Over-Cyl Over-Dist VA OZD    Right Essilor Bitoric 7.76/7.36 9.0 -4.75/-6.50 02/2022 Proctor Sphere 20/15 8.0    Left Essilor Bitoric 7.48/6.97 9.0 -6.50/-9.75 06/2023 -0.25 Sphere 20/15 8.1        CT PC1 PC2    Right       Left 0.12 8.25 9.25      Comments: Scratch on lens OD, centers well, good edge lift, good NaFl pattern  OS centers well, good edge lift, good NaFl pattern

## 2023-11-20 NOTE — PROGRESS NOTES
Assessment/Plan   Diagnoses and all orders for this visit:  Blurry vision  -     OCT, Optic Nerve - OU - Both Eyes  Subjective visual disturbance  Discussed. No evidence of optic disc edema OD, OS on exam or OCT RNFL. Visual acuity (VA) in habitual CLs is 20/15 and 20/20. CL OD does have a few scratches which may be contributing to subjective complaint of 'blurrier' vision. Rigid gas permeable (RGP) OD is from 02/2022 and OS is from 06/2023.   Contrast sensitivity measured normal OD, OS (was checked post dilation, would check pre-dilation at next).   RTC 6 months for comprehensive exam w/ OCT RNFL and contrast sensitivity.   F/up with PCP and allergy team

## 2023-11-20 NOTE — PROGRESS NOTES
Patient ID: Qi Pearce is a 48 y.o. female.  Referring Physician: No referring provider defined for this encounter.  Primary Care Provider: Myrtle Trejo MD  Visit Type: Initial Visit  Diagnosis: elevated tryptase      Subjective    HPI  48 y.o. female with a PMH significant for ?endometriosis, recurrent sinus infections, ?gluten intolerance which has resolved on gluten exclusion.   Referred for eval of progressively elevated tryptase.     Had an anaphylactic reaction to a bee sting in '22 after which she was seen by allergy for the work up. Work up showed elevated tryptase which has progressively increased now over 20. KIT D816V negative.   Has had seasonal allergies in the past used to be a  with many stings but never had an anaphylactic reaction. Allergies have worsened since the last 2 yrs. Getting desensitization therapy for her anaphylaxis with weekly injections to environmental and bee allergens. During these events also takes Zyrtec, last was 1 week back.   Notes increased frequency of infections since the bee sting. None severe.     Notes fatigue, no B s/s. No new rash. No GI or  complaints.   Also got stung by a yellow jacket in October '23, on her tongue, notes confusion during these events, did not use her epipen immediately. Used IV steroids, benadryl and pepcid, then took epipen.   Other meds include quercitin, stinging keyur, vit B, D, omega 3 and magnesium.   Age appropriate cancer screening: last colo '21 was WNL follow up in 10yrs, mammo '23, pap '23.   FMH: identical twin sister had NHL age 20, she also has URI allergy like symptoms, father had small cell of unknown origin, small cell pat GM, mat GF lung cancer.   Social history: never smoker, no ETOH, no RDA.     Review of Systems - Oncology  10 point review of systems negative except as stated in HPI    Objective   Active Ambulatory Problems     Diagnosis Date Noted    Basal cell carcinoma of skin of unspecified parts  "of face 02/23/2016    Blepharitis of both eyes 10/03/2023    Dry eye syndrome of both lacrimal glands 10/03/2023    Myopia of both eyes 10/03/2023    Keratoconjunctivitis sicca of both eyes not due to Sjogren's syndrome 10/03/2023    Vulvar abscess 10/03/2023     Resolved Ambulatory Problems     Diagnosis Date Noted    No Resolved Ambulatory Problems     Past Medical History:   Diagnosis Date    Allergy status to unspecified drugs, medicaments and biological substances     Other conditions influencing health status     Ovarian cyst      No past surgical history on file.    Family History   Problem Relation Name Age of Onset    Melanoma Mother      Lymphoma Sister      Lung cancer Maternal Grandmother      Lung cancer Paternal Grandfather       Oncology History    No history exists.       Physical Exam  Vitals reviewed.   Constitutional:       General: She is not in acute distress.     Appearance: Normal appearance. She is not toxic-appearing.   HENT:      Head: Normocephalic and atraumatic.   Eyes:      Comments: No pallor or icterus    Neck:      Comments: No cervical or axillary lymphadenopathy   Cardiovascular:      Rate and Rhythm: Normal rate and regular rhythm.      Heart sounds: No murmur heard.  Pulmonary:      Effort: Pulmonary effort is normal.      Breath sounds: Normal breath sounds.   Abdominal:      General: There is no distension.      Palpations: Abdomen is soft.      Tenderness: There is no abdominal tenderness.      Comments: No palpable hepatosplenomegaly    Musculoskeletal:      Comments: No pedal edema    Skin:     Comments: Has erythema over face, apparently this is her baseline with rosacea   Neurological:      General: No focal deficit present.      Mental Status: She is alert and oriented to person, place, and time.   Psychiatric:         Mood and Affect: Mood normal.       BSA: 1.9 meters squared  /73   Pulse 84   Temp 36.4 °C (97.5 °F)   Resp 17   Ht (S) 1.784 m (5' 10.24\")   Wt " "73.1 kg (161 lb 1.6 oz)   SpO2 100%   BMI 22.96 kg/m²     Labs:  Lab Results   Component Value Date    WBC 4.7 07/19/2023    NEUTROABS 2.90 07/19/2023    IGABSOL 0.01 12/24/2020    LYMPHSABS 1.28 07/19/2023    MONOSABS 0.38 07/19/2023    EOSABS 0.05 07/19/2023    BASOSABS 0.04 07/19/2023    RBC 4.40 07/19/2023    MCV 96 07/19/2023    MCHC 33.9 07/19/2023    HGB 14.3 07/19/2023    HCT 42.2 07/19/2023     07/19/2023     No results found for: \"RETICCTPCT\"   Lab Results   Component Value Date    CREATININE 0.81 07/19/2023    BUN 12 07/19/2023    EGFR 96 12/24/2020     07/19/2023    K 4.7 07/19/2023     07/19/2023    CO2 27 07/19/2023      Lab Results   Component Value Date    ALT 14 07/19/2023    AST 15 07/19/2023    ALKPHOS 61 07/19/2023    BILITOT 0.5 07/19/2023      Lab Results   Component Value Date    TSH 2.91 07/19/2023     Lab Results   Component Value Date    TSH 2.91 07/19/2023    THYROIDPAB <28 04/07/2023     No results found for: \"IRON\", \"TIBC\", \"FERRITIN\"   No results found for: \"XGCXIKBL55\"   No results found for: \"FOLATE\"  No results found for: \"SHEILA\", \"RF\", \"SEDRATE\"   No results found for: \"CRP\"   No results found for: \"JERALD\"  No results found for: \"LDH\"  No results found for: \"HAPTOGLOBIN\"  No results found for: \"SPEP\"  No results found for: \"IGG\", \"IGM\", \"IGA\"     No components found for: \"PT\"  No results found for: \"APTT\"    Assessment/Plan    48 y.o. female with a PMH significant for rosacea, ?endometriosis, recurrent sinus infections, ?gluten intolerance which has resolved on gluten exclusion.   Referred for eval of progressively elevated tryptase.    Briefly her recent bout of medical issues started with an anaphylactic reaction to a bee sting, after which she started desensitization therapy and most recently was stung in the mouth by a yellow jacket. The pt has had progressively elevated tryptase (now >20), notably with repeated exposure and while being symptomatic from her " allergies with KIT D816V negative.   Labs from our visit show a decreased tryptase to ~15. With a normal CBC, exam also not suggestive of organomegaly.   As discussed with pt, the next step as relates to ruling out mastocytosis would be to proceed with bone marrow biopsy, her negative KIT mutation and decreasing tryptase make this less likely but do not necessarily exclude this. It is possible her tryptase is elevated from repeated exposure to allergen alone as this has never been documented at baseline.   Pt is not enthusiastic to proceed with biopsy at this time favors monitoring, which is reasonable.   Plan:   - 3 month follow up with CBC/diff and tryptase level, preferably with a gap from last allergen exposure.     Jose Song MD

## 2023-11-22 ENCOUNTER — APPOINTMENT (OUTPATIENT)
Dept: ALLERGY | Facility: CLINIC | Age: 48
End: 2023-11-22
Payer: COMMERCIAL

## 2023-11-22 LAB — TRYPTASE SERPL-MCNC: 15.3 UG/L

## 2023-11-29 ENCOUNTER — APPOINTMENT (OUTPATIENT)
Dept: ALLERGY | Facility: CLINIC | Age: 48
End: 2023-11-29
Payer: COMMERCIAL

## 2023-11-29 ENCOUNTER — OFFICE VISIT (OUTPATIENT)
Dept: ALLERGY | Facility: CLINIC | Age: 48
End: 2023-11-29
Payer: COMMERCIAL

## 2023-11-29 VITALS
HEART RATE: 79 BPM | WEIGHT: 161 LBS | HEIGHT: 70 IN | BODY MASS INDEX: 23.05 KG/M2 | DIASTOLIC BLOOD PRESSURE: 75 MMHG | OXYGEN SATURATION: 98 % | SYSTOLIC BLOOD PRESSURE: 116 MMHG

## 2023-11-29 DIAGNOSIS — T78.2XXA ANAPHYLAXIS, INITIAL ENCOUNTER: ICD-10-CM

## 2023-11-29 DIAGNOSIS — J30.81 ALLERGIC RHINITIS DUE TO ANIMAL (CAT) (DOG) HAIR AND DANDER: ICD-10-CM

## 2023-11-29 DIAGNOSIS — J30.9 ALLERGIC RHINITIS, UNSPECIFIED SEASONALITY, UNSPECIFIED TRIGGER: ICD-10-CM

## 2023-11-29 DIAGNOSIS — T63.91XA TOXIC EFFECT OF VENOM, ACCIDENTAL OR UNINTENTIONAL, INITIAL ENCOUNTER: Primary | ICD-10-CM

## 2023-11-29 PROCEDURE — 1036F TOBACCO NON-USER: CPT | Performed by: ALLERGY & IMMUNOLOGY

## 2023-11-29 PROCEDURE — 99214 OFFICE O/P EST MOD 30 MIN: CPT | Performed by: ALLERGY & IMMUNOLOGY

## 2023-11-29 RX ORDER — EPINEPHRINE 0.3 MG/.3ML
0.3 INJECTION SUBCUTANEOUS AS NEEDED
Qty: 2 EACH | Refills: 3 | Status: SHIPPED | OUTPATIENT
Start: 2023-11-29

## 2023-11-29 NOTE — PROGRESS NOTES
"Subjective   Patient ID:   [id number]   Qi Pearce is a 48 y.o. female who presents for Follow-up (Had reaction to last injection).    Chief Complaint   Patient presents with    Follow-up     Had reaction to last injection          HPI  This patient is here to evaluate for:    Reactions after her shots:  3 hrs after shots, she notices headache , itchy eyes,   Large locals; no systemic rash/hives.  Wasn't feeling well so did not come in last week.  She is just feeling better so wants to hold off on her allergy shots.     Currently, she is on venom 1ml maintenance (for the past 3-4 months) and in upper range of the green vials (environmental)    In general, during the summer, she reported feeling foggy headed, fatigue and Lower heat tolerance. Attributes this to allergy.  She is not working now, as a , due to the weather, and doesn't have these sxs anymore.    She saw Dr. Song in heme-onc and I reviewed the note on 11/20/23  They are considering a bone marrow biopsy. There was concern that her elevated tryptase was related to allergy.    Has sizeable swelling, exhausted, wiped out.    Of note, the last tryptase was 11/20/23 and this was 5 days AFTER her allergy shots; the shot should not in any way affect the tryptase level.       Review of Systems   All other systems reviewed and are negative.        Objective     /75   Pulse 79   Ht 1.784 m (5' 10.24\")   Wt 73 kg (161 lb)   SpO2 98%   BMI 22.94 kg/m²      Physical Exam  Vitals and nursing note reviewed.   Constitutional:       Appearance: Normal appearance. She is normal weight.   HENT:      Head: Normocephalic and atraumatic.      Right Ear: External ear normal.      Left Ear: External ear normal.      Nose: No septal deviation, congestion or rhinorrhea.      Right Turbinates: Not enlarged, swollen or pale.      Left Turbinates: Not enlarged, swollen or pale.      Mouth/Throat:      Mouth: Mucous membranes are moist.   Eyes:      Extraocular " "Movements: Extraocular movements intact.      Conjunctiva/sclera: Conjunctivae normal.      Pupils: Pupils are equal, round, and reactive to light.   Cardiovascular:      Rate and Rhythm: Normal rate and regular rhythm.      Pulses: Normal pulses.      Heart sounds: Normal heart sounds.   Pulmonary:      Effort: Pulmonary effort is normal.      Breath sounds: Normal breath sounds. No wheezing, rhonchi or rales.   Musculoskeletal:         General: Normal range of motion.   Skin:     General: Skin is warm and dry.      Findings: No erythema or rash.   Neurological:      General: No focal deficit present.      Mental Status: She is alert and oriented to person, place, and time.   Psychiatric:         Mood and Affect: Mood normal.         Behavior: Behavior normal.          Current Outpatient Medications   Medication Sig Dispense Refill    cholecalciferol (Vitamin D-3) 25 MCG (1000 UT) capsule Take by mouth.      cyanocobalamin, vitamin B-12, (VITAMIN B-12 ORAL) Take by mouth.      docosahexaenoic acid/epa (FISH OIL ORAL) Fish Oil CAPS   Refills: 0       Active      EPINEPHrine 0.3 mg/0.3 mL injection syringe Inject into upper leg. Call 911 after use.      lactobacillus acidophilus (Acidophilus) capsule Take by mouth.      multivitamin capsule Take 1 capsule by mouth once daily.       No current facility-administered medications for this visit.        No components found for: \"LASTLABSF\"       Assessment/Plan   Diagnoses and all orders for this visit:  Toxic effect of venom, accidental or unintentional, initial encounter  -     Tryptase; Future  -     Tryptase; Future  Allergic rhinitis, unspecified seasonality, unspecified trigger  Allergic rhinitis due to animal (cat) (dog) hair and dander  Anaphylaxis, initial encounter  -     Tryptase; Future  -     Tryptase; Future        Next week, will administer venom (but will need to reduce to 0.8ml)  She prefers to avoid any allergy shots today.   Next week: draw tryptase lab " - to be scientific, this will be a good baseline level, far away from any allergy shots or pollen exposure.  Venom shot 0.8ml for MV/W/HB (it will be 7 weeks from last venom so need to reduce dose)  Check tryptase level 1 hour afterwards to determine if any mast cell tryptase/histamine release.    The following week, decreased environmental shot  The following week, maintenance venom shot.  See allergy shot sheet for additional instructions.       Naren Vanessa MD

## 2023-12-01 ENCOUNTER — TELEPHONE (OUTPATIENT)
Dept: HEMATOLOGY/ONCOLOGY | Facility: HOSPITAL | Age: 48
End: 2023-12-01
Payer: COMMERCIAL

## 2023-12-01 NOTE — TELEPHONE ENCOUNTER
RN spoke to patient and she said she has had cold and sinus symptoms on and off and just feels like she shouldn't wait longer to get the marrow. Message sent to MD that she would like to get the marrow this month. RN also went over what to expect the day of the procedure.

## 2023-12-04 DIAGNOSIS — D47.02 SYSTEMIC MASTOCYTOSIS: Primary | ICD-10-CM

## 2023-12-05 NOTE — TELEPHONE ENCOUNTER
Patient is calling back to get an order placed she would like it done before the new year for insurance purposes.

## 2023-12-06 ENCOUNTER — CLINICAL SUPPORT (OUTPATIENT)
Dept: ALLERGY | Facility: CLINIC | Age: 48
End: 2023-12-06
Payer: COMMERCIAL

## 2023-12-06 ENCOUNTER — LAB (OUTPATIENT)
Dept: LAB | Facility: LAB | Age: 48
End: 2023-12-06
Payer: COMMERCIAL

## 2023-12-06 DIAGNOSIS — T78.2XXA ANAPHYLAXIS, INITIAL ENCOUNTER: ICD-10-CM

## 2023-12-06 DIAGNOSIS — T63.91XA TOXIC EFFECT OF VENOM, ACCIDENTAL OR UNINTENTIONAL, INITIAL ENCOUNTER: ICD-10-CM

## 2023-12-06 DIAGNOSIS — T63.91XA TOXIC EFFECT OF CONTACT WITH UNSPECIFIED VENOMOUS ANIMAL, ACCIDENTAL (UNINTENTIONAL), INITIAL ENCOUNTER: Primary | ICD-10-CM

## 2023-12-06 PROCEDURE — 36415 COLL VENOUS BLD VENIPUNCTURE: CPT

## 2023-12-06 PROCEDURE — 83520 IMMUNOASSAY QUANT NOS NONAB: CPT

## 2023-12-06 PROCEDURE — 95117 IMMUNOTHERAPY INJECTIONS: CPT | Performed by: ALLERGY & IMMUNOLOGY

## 2023-12-07 LAB
TRYPTASE SERPL-MCNC: 13.1 UG/L
TRYPTASE SERPL-MCNC: 13.7 UG/L

## 2023-12-08 DIAGNOSIS — D47.02 SYSTEMIC MASTOCYTOSIS: Primary | ICD-10-CM

## 2023-12-13 ENCOUNTER — CLINICAL SUPPORT (OUTPATIENT)
Dept: ALLERGY | Facility: CLINIC | Age: 48
End: 2023-12-13
Payer: COMMERCIAL

## 2023-12-13 DIAGNOSIS — J30.89 NON-SEASONAL ALLERGIC RHINITIS, UNSPECIFIED TRIGGER: ICD-10-CM

## 2023-12-13 DIAGNOSIS — J30.81 ALLERGIC RHINITIS DUE TO ANIMAL (CAT) (DOG) HAIR AND DANDER: ICD-10-CM

## 2023-12-13 DIAGNOSIS — J30.9 ALLERGIC RHINITIS, UNSPECIFIED SEASONALITY, UNSPECIFIED TRIGGER: ICD-10-CM

## 2023-12-13 PROCEDURE — 95117 IMMUNOTHERAPY INJECTIONS: CPT | Performed by: ALLERGY & IMMUNOLOGY

## 2023-12-14 ENCOUNTER — LAB (OUTPATIENT)
Dept: LAB | Facility: LAB | Age: 48
End: 2023-12-14
Payer: COMMERCIAL

## 2023-12-14 DIAGNOSIS — D89.40 MAST CELL ACTIVATION, UNSPECIFIED (MULTI): Primary | ICD-10-CM

## 2023-12-14 PROCEDURE — 83520 IMMUNOASSAY QUANT NOS NONAB: CPT

## 2023-12-14 PROCEDURE — 36415 COLL VENOUS BLD VENIPUNCTURE: CPT

## 2023-12-16 LAB — TRYPTASE SERPL-MCNC: 14.6 UG/L

## 2023-12-27 ENCOUNTER — HOSPITAL ENCOUNTER (OUTPATIENT)
Dept: RADIOLOGY | Facility: HOSPITAL | Age: 48
Discharge: HOME | End: 2023-12-27
Payer: COMMERCIAL

## 2023-12-27 VITALS
TEMPERATURE: 97.3 F | SYSTOLIC BLOOD PRESSURE: 99 MMHG | RESPIRATION RATE: 17 BRPM | HEART RATE: 7 BPM | OXYGEN SATURATION: 100 % | DIASTOLIC BLOOD PRESSURE: 74 MMHG

## 2023-12-27 DIAGNOSIS — D47.02 SYSTEMIC MASTOCYTOSIS: ICD-10-CM

## 2023-12-27 LAB
BASOPHILS # BLD AUTO: 0.02 X10*3/UL (ref 0–0.1)
BASOPHILS NFR BLD AUTO: 0.5 %
EOSINOPHIL # BLD AUTO: 0 X10*3/UL (ref 0–0.7)
EOSINOPHIL NFR BLD AUTO: 0 %
ERYTHROCYTE [DISTWIDTH] IN BLOOD BY AUTOMATED COUNT: 12.6 % (ref 11.5–14.5)
HCT VFR BLD AUTO: 44.1 % (ref 36–46)
HGB BLD-MCNC: 15.3 G/DL (ref 12–16)
IMM GRANULOCYTES # BLD AUTO: 0 X10*3/UL (ref 0–0.7)
IMM GRANULOCYTES NFR BLD AUTO: 0 % (ref 0–0.9)
LYMPHOCYTES # BLD AUTO: 1.33 X10*3/UL (ref 1.2–4.8)
LYMPHOCYTES NFR BLD AUTO: 32 %
MCH RBC QN AUTO: 31.7 PG (ref 26–34)
MCHC RBC AUTO-ENTMCNC: 34.7 G/DL (ref 32–36)
MCV RBC AUTO: 91 FL (ref 80–100)
MONOCYTES # BLD AUTO: 0.31 X10*3/UL (ref 0.1–1)
MONOCYTES NFR BLD AUTO: 7.5 %
NEUTROPHILS # BLD AUTO: 2.49 X10*3/UL (ref 1.2–7.7)
NEUTROPHILS NFR BLD AUTO: 60 %
NRBC BLD-RTO: 0 /100 WBCS (ref 0–0)
PLATELET # BLD AUTO: 223 X10*3/UL (ref 150–450)
RBC # BLD AUTO: 4.83 X10*6/UL (ref 4–5.2)
WBC # BLD AUTO: 4.2 X10*3/UL (ref 4.4–11.3)

## 2023-12-27 PROCEDURE — 88311 DECALCIFY TISSUE: CPT | Performed by: PATHOLOGY

## 2023-12-27 PROCEDURE — 81273 KIT GENE ANALYS D816 VARIANT: CPT | Mod: 59 | Performed by: STUDENT IN AN ORGANIZED HEALTH CARE EDUCATION/TRAINING PROGRAM

## 2023-12-27 PROCEDURE — 2720000007 HC OR 272 NO HCPCS

## 2023-12-27 PROCEDURE — 81450 HL NEO GSAP 5-50DNA/DNA&RNA: CPT | Performed by: STUDENT IN AN ORGANIZED HEALTH CARE EDUCATION/TRAINING PROGRAM

## 2023-12-27 PROCEDURE — 88342 IMHCHEM/IMCYTCHM 1ST ANTB: CPT | Mod: TC,SUR,GEALAB | Performed by: STUDENT IN AN ORGANIZED HEALTH CARE EDUCATION/TRAINING PROGRAM

## 2023-12-27 PROCEDURE — 38222 DX BONE MARROW BX & ASPIR: CPT | Performed by: STUDENT IN AN ORGANIZED HEALTH CARE EDUCATION/TRAINING PROGRAM

## 2023-12-27 PROCEDURE — C1830 POWER BONE MARROW BX NEEDLE: HCPCS

## 2023-12-27 PROCEDURE — 88342 IMHCHEM/IMCYTCHM 1ST ANTB: CPT | Performed by: PATHOLOGY

## 2023-12-27 PROCEDURE — 77012 CT SCAN FOR NEEDLE BIOPSY: CPT | Performed by: STUDENT IN AN ORGANIZED HEALTH CARE EDUCATION/TRAINING PROGRAM

## 2023-12-27 PROCEDURE — 2500000004 HC RX 250 GENERAL PHARMACY W/ HCPCS (ALT 636 FOR OP/ED): Performed by: STUDENT IN AN ORGANIZED HEALTH CARE EDUCATION/TRAINING PROGRAM

## 2023-12-27 PROCEDURE — 77012 CT SCAN FOR NEEDLE BIOPSY: CPT

## 2023-12-27 PROCEDURE — 88305 TISSUE EXAM BY PATHOLOGIST: CPT | Performed by: PATHOLOGY

## 2023-12-27 PROCEDURE — 88189 FLOWCYTOMETRY/READ 16 & >: CPT | Performed by: STUDENT IN AN ORGANIZED HEALTH CARE EDUCATION/TRAINING PROGRAM

## 2023-12-27 PROCEDURE — 99152 MOD SED SAME PHYS/QHP 5/>YRS: CPT

## 2023-12-27 PROCEDURE — 88341 IMHCHEM/IMCYTCHM EA ADD ANTB: CPT | Performed by: PATHOLOGY

## 2023-12-27 PROCEDURE — 36415 COLL VENOUS BLD VENIPUNCTURE: CPT | Performed by: STUDENT IN AN ORGANIZED HEALTH CARE EDUCATION/TRAINING PROGRAM

## 2023-12-27 PROCEDURE — 88313 SPECIAL STAINS GROUP 2: CPT | Performed by: PATHOLOGY

## 2023-12-27 PROCEDURE — 85097 BONE MARROW INTERPRETATION: CPT | Mod: TC,GEALAB | Performed by: STUDENT IN AN ORGANIZED HEALTH CARE EDUCATION/TRAINING PROGRAM

## 2023-12-27 PROCEDURE — G0452 MOLECULAR PATHOLOGY INTERPR: HCPCS | Performed by: STUDENT IN AN ORGANIZED HEALTH CARE EDUCATION/TRAINING PROGRAM

## 2023-12-27 PROCEDURE — 88280 CHROMOSOME KARYOTYPE STUDY: CPT | Performed by: STUDENT IN AN ORGANIZED HEALTH CARE EDUCATION/TRAINING PROGRAM

## 2023-12-27 PROCEDURE — 88185 FLOWCYTOMETRY/TC ADD-ON: CPT | Mod: TC,GEALAB | Performed by: STUDENT IN AN ORGANIZED HEALTH CARE EDUCATION/TRAINING PROGRAM

## 2023-12-27 PROCEDURE — 99152 MOD SED SAME PHYS/QHP 5/>YRS: CPT | Performed by: STUDENT IN AN ORGANIZED HEALTH CARE EDUCATION/TRAINING PROGRAM

## 2023-12-27 PROCEDURE — 85025 COMPLETE CBC W/AUTO DIFF WBC: CPT | Performed by: STUDENT IN AN ORGANIZED HEALTH CARE EDUCATION/TRAINING PROGRAM

## 2023-12-27 RX ORDER — FENTANYL CITRATE 50 UG/ML
INJECTION, SOLUTION INTRAMUSCULAR; INTRAVENOUS
Status: DISPENSED
Start: 2023-12-27 | End: 2023-12-27

## 2023-12-27 RX ORDER — FENTANYL CITRATE 50 UG/ML
INJECTION, SOLUTION INTRAMUSCULAR; INTRAVENOUS
Status: COMPLETED | OUTPATIENT
Start: 2023-12-27 | End: 2023-12-27

## 2023-12-27 RX ORDER — MIDAZOLAM HYDROCHLORIDE 1 MG/ML
INJECTION INTRAMUSCULAR; INTRAVENOUS
Status: COMPLETED | OUTPATIENT
Start: 2023-12-27 | End: 2023-12-27

## 2023-12-27 RX ORDER — MIDAZOLAM HYDROCHLORIDE 1 MG/ML
INJECTION, SOLUTION INTRAMUSCULAR; INTRAVENOUS
Status: DISPENSED
Start: 2023-12-27 | End: 2023-12-27

## 2023-12-27 RX ADMIN — FENTANYL CITRATE 100 MCG: 50 INJECTION, SOLUTION INTRAMUSCULAR; INTRAVENOUS at 09:07

## 2023-12-27 RX ADMIN — MIDAZOLAM HYDROCHLORIDE 1 MG: 1 INJECTION, SOLUTION INTRAMUSCULAR; INTRAVENOUS at 09:08

## 2023-12-27 ASSESSMENT — PAIN SCALES - GENERAL: PAINLEVEL_OUTOF10: 0 - NO PAIN

## 2023-12-27 NOTE — DISCHARGE INSTRUCTIONS
Okay to remove band-aid tomorrow  Okay to shower tomorrow   Okay to take tylenol as needed for pain  Okay to use ice as neeed for pain  Follow up with Dr. Archer in 4 weeks or results  Resume normal diet  No driving for 24 hours  No making any life changing decisions for 24 hours  No drinking alcohol for 24 hours

## 2023-12-27 NOTE — POST-PROCEDURE NOTE
Interventional Radiology Brief Postprocedure Note    Attending: Zenaida Walker MD      Assistant: none    Diagnosis: Rule out mastocytosis    Description of procedure: The patient was placed in [prone] position on the interventional CT scanner bed. An initial  image and axial noncontrast CT images of the pelvis were obtained. Imaging findings are discussed below. A posterior percutaneous approach was chosen for biopsy of the  [left] iliac. The skin site was marked, prepped, and draped in usual sterile fashion. Local anesthesia of the skin and deep tissues was administered with 1% lidocaine.     An 11 gauge coaxial needle system was advanced to the iliac and seated within the bone. Initial marrow aspirate was obtained, which was found to contain spicules. Then, multiple additional aspirates were obtained in heparinized and non-heparinized syringes. Finally, core biopsy was performed through the coaxial system using a powered 13 gauge biopsy needle ([one core specimen obtained]). The needles were removed and sterile dressing applied.      Samples were deemed adequate by the bone marrow pathology team in the procedure area and taken for analysis.     Anesthesia:  Moderate    Complications: None    Estimated Blood Loss: none    Medications (Filter: Administrations occurring from 0900 to 0929 on 12/27/23) As of 12/27/23 0929      fentaNYL PF (Sublimaze) injection (mcg) Total dose:  100 mcg      Date/Time Rate/Dose/Volume Action       12/27/23  0907 100 mcg Given               midazolam (Versed) injection (mg) Total dose:  1 mg      Date/Time Rate/Dose/Volume Action       12/27/23  0908 1 mg Given                   No specimens collected      See detailed result report with images in PACS.    The patient tolerated the procedure well without incident or complication and is in stable condition.

## 2023-12-27 NOTE — PRE-PROCEDURE NOTE
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    Relevant review of systems: n/a    Relevant Labs:   Lab Results   Component Value Date    CREATININE 0.81 07/19/2023    EGFR 96 12/24/2020       Planned Sedation/Anesthesia: Moderate    Airway assessment:  n/a    Directed physical examination:    N/a    Mallampati:  n/a    ASA Score: ASA 1 - Normal health patient    Benefits, risks and alternatives of procedure and planned sedation have been discussed with the patient and/or their representative. All questions answered and they agree to proceed.

## 2023-12-27 NOTE — PRE-PROCEDURE NOTE
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    Relevant review of systems:  With in normal     Relevant Labs:   Lab Results   Component Value Date    CREATININE 0.81 07/19/2023    EGFR 96 12/24/2020       Planned Sedation/Anesthesia: Moderate    Airway assessment: normal    Directed physical examination:    Normal    Mallampati: I (soft palate, uvula, fauces, and tonsillar pillars visible)    ASA Score: ASA 1 - Normal health patient    Benefits, risks and alternatives of procedure and planned sedation have been discussed with the patient and/or their representative. All questions answered and they agree to proceed.

## 2023-12-29 ENCOUNTER — LAB (OUTPATIENT)
Dept: LAB | Facility: LAB | Age: 48
End: 2023-12-29
Payer: COMMERCIAL

## 2023-12-29 DIAGNOSIS — R94.6 ABNORMAL RESULTS OF THYROID FUNCTION STUDIES: ICD-10-CM

## 2023-12-29 DIAGNOSIS — R20.2 PARESTHESIA OF SKIN: Primary | ICD-10-CM

## 2023-12-29 LAB
T3FREE SERPL-MCNC: 2.9 PG/ML (ref 2.3–4.2)
T4 FREE SERPL-MCNC: 0.76 NG/DL (ref 0.61–1.12)
VIT B12 SERPL-MCNC: 1005 PG/ML (ref 211–911)

## 2023-12-29 PROCEDURE — 84482 T3 REVERSE: CPT

## 2023-12-29 PROCEDURE — 84481 FREE ASSAY (FT-3): CPT

## 2023-12-29 PROCEDURE — 84439 ASSAY OF FREE THYROXINE: CPT

## 2023-12-29 PROCEDURE — 36415 COLL VENOUS BLD VENIPUNCTURE: CPT

## 2023-12-29 PROCEDURE — 84445 ASSAY OF TSI GLOBULIN: CPT

## 2023-12-29 PROCEDURE — 82607 VITAMIN B-12: CPT

## 2024-01-02 LAB
CELL COUNT (BLOOD): 3.9 X10*3/UL
CELL POPULATIONS: NORMAL
DIAGNOSIS: NORMAL
FLOW DIFFERENTIAL: NORMAL
FLOW TEST ORDERED: NORMAL
LAB TEST METHOD: NORMAL
NUMBER OF CELLS COLLECTED: NORMAL
PATH REPORT.TOTAL CANCER: NORMAL
SIGNATURE COMMENT: NORMAL
SPECIMEN VIABILITY: NORMAL
T3REVERSE SERPL-MCNC: 13.6 NG/DL (ref 9–27)
TSI SER-ACNC: <1 TSI INDEX

## 2024-01-04 LAB
ELECTRONICALLY SIGNED BY: NORMAL
MYELOID NGS RESULTS: NORMAL

## 2024-01-10 ENCOUNTER — APPOINTMENT (OUTPATIENT)
Dept: ALLERGY | Facility: CLINIC | Age: 49
End: 2024-01-10
Payer: COMMERCIAL

## 2024-01-15 LAB
ELECTRONICALLY SIGNED BY: NORMAL
KIT D816V INTERPRETATION: NORMAL
KIT D816V MUTATION RESULTS: NOT DETECTED

## 2024-01-16 ENCOUNTER — TELEPHONE (OUTPATIENT)
Dept: HEMATOLOGY/ONCOLOGY | Facility: HOSPITAL | Age: 49
End: 2024-01-16
Payer: COMMERCIAL

## 2024-01-16 DIAGNOSIS — R74.8 ELEVATED SERUM TRYPTASE: ICD-10-CM

## 2024-01-16 LAB
LAB AP CONSOLIDATED THERANOSTIC REPORT: NORMAL
PATH REPORT.COMMENTS IMP SPEC: NORMAL
PATH REPORT.FINAL DX SPEC: NORMAL
PATH REPORT.GROSS SPEC: NORMAL
PATH REPORT.MICROSCOPIC SPEC OTHER STN: NORMAL
PATH REPORT.RELEVANT HX SPEC: NORMAL
PATH REPORT.TOTAL CANCER: NORMAL

## 2024-01-16 NOTE — TELEPHONE ENCOUNTER
RN called and spoke to patient and informed her that Dr. Song would like to see her sooner than her FUV on 2/19 to go over test results. She said she would be available on 2/1 at 9:40am. Scheduling order placed.

## 2024-01-17 ENCOUNTER — APPOINTMENT (OUTPATIENT)
Dept: ALLERGY | Facility: CLINIC | Age: 49
End: 2024-01-17
Payer: COMMERCIAL

## 2024-01-23 LAB
CELLS ANALYZED: 6 CELLS
CHROM ANALY OVERALL INTERP-IMP: NORMAL
CHROMOS CYTO BASIC ASSOC OBS PNL BLD/T: 4 CELLS
CHROMOSOME ANALYSIS MASTER PANEL: 0 CELLS
CHROMOSOME ANALYSIS MASTER PANEL: 0 CELLS
CHROMOSOME ANALYSIS MASTER PANEL: 46 CHROMOSOMES
CHROMOSOME ANALYSIS MASTER PANEL: NORMAL
ELECTRONICALLY SIGNED BY CYTOGENETICS: NORMAL
ISCN BAND LEVEL QL: 400 BANDS
KARYOTYP MAR: 2 CELLS
TOTAL CELLS COUNTED MAR: 6 CELLS

## 2024-01-24 ENCOUNTER — CLINICAL SUPPORT (OUTPATIENT)
Dept: ALLERGY | Facility: CLINIC | Age: 49
End: 2024-01-24
Payer: COMMERCIAL

## 2024-01-24 DIAGNOSIS — T63.444A TOXIC EFFECT OF VENOM OF BEES, UNDETERMINED INTENT, INITIAL ENCOUNTER: ICD-10-CM

## 2024-01-24 PROCEDURE — 95117 IMMUNOTHERAPY INJECTIONS: CPT | Performed by: ALLERGY & IMMUNOLOGY

## 2024-01-31 ENCOUNTER — CLINICAL SUPPORT (OUTPATIENT)
Dept: ALLERGY | Facility: CLINIC | Age: 49
End: 2024-01-31
Payer: COMMERCIAL

## 2024-01-31 DIAGNOSIS — T63.444A TOXIC EFFECT OF VENOM OF BEES, UNDETERMINED INTENT, INITIAL ENCOUNTER: ICD-10-CM

## 2024-01-31 PROCEDURE — 95117 IMMUNOTHERAPY INJECTIONS: CPT | Performed by: ALLERGY & IMMUNOLOGY

## 2024-02-01 ENCOUNTER — OFFICE VISIT (OUTPATIENT)
Dept: HEMATOLOGY/ONCOLOGY | Facility: HOSPITAL | Age: 49
End: 2024-02-01
Payer: COMMERCIAL

## 2024-02-01 VITALS
BODY MASS INDEX: 22.77 KG/M2 | SYSTOLIC BLOOD PRESSURE: 102 MMHG | RESPIRATION RATE: 17 BRPM | WEIGHT: 159.8 LBS | TEMPERATURE: 97.2 F | OXYGEN SATURATION: 100 % | DIASTOLIC BLOOD PRESSURE: 71 MMHG | HEART RATE: 79 BPM

## 2024-02-01 DIAGNOSIS — R74.8 ELEVATED SERUM TRYPTASE: ICD-10-CM

## 2024-02-01 PROCEDURE — 99214 OFFICE O/P EST MOD 30 MIN: CPT | Performed by: STUDENT IN AN ORGANIZED HEALTH CARE EDUCATION/TRAINING PROGRAM

## 2024-02-01 PROCEDURE — 1036F TOBACCO NON-USER: CPT | Performed by: STUDENT IN AN ORGANIZED HEALTH CARE EDUCATION/TRAINING PROGRAM

## 2024-02-01 RX ORDER — MILK THISTLE 150 MG
1 CAPSULE ORAL 2 TIMES DAILY
COMMUNITY

## 2024-02-01 ASSESSMENT — ENCOUNTER SYMPTOMS
DEPRESSION: 0
OCCASIONAL FEELINGS OF UNSTEADINESS: 0
LOSS OF SENSATION IN FEET: 0

## 2024-02-01 ASSESSMENT — PAIN SCALES - GENERAL: PAINLEVEL: 0-NO PAIN

## 2024-02-01 ASSESSMENT — PATIENT HEALTH QUESTIONNAIRE - PHQ9
1. LITTLE INTEREST OR PLEASURE IN DOING THINGS: NOT AT ALL
SUM OF ALL RESPONSES TO PHQ9 QUESTIONS 1 AND 2: 0
2. FEELING DOWN, DEPRESSED OR HOPELESS: NOT AT ALL

## 2024-02-01 ASSESSMENT — COLUMBIA-SUICIDE SEVERITY RATING SCALE - C-SSRS
6. HAVE YOU EVER DONE ANYTHING, STARTED TO DO ANYTHING, OR PREPARED TO DO ANYTHING TO END YOUR LIFE?: NO
2. HAVE YOU ACTUALLY HAD ANY THOUGHTS OF KILLING YOURSELF?: NO
1. IN THE PAST MONTH, HAVE YOU WISHED YOU WERE DEAD OR WISHED YOU COULD GO TO SLEEP AND NOT WAKE UP?: NO

## 2024-02-01 NOTE — PROGRESS NOTES
Patient ID: Qi Pearce is a 48 y.o. female.  Referring Physician: Jose Archer MD  [id number] [phone]  Primary Care Provider: Myrtle Trejo MD  Visit Type: Follow Up  Diagnosis: elevated tryptase      Subjective    HPI  48 y.o. female with a PMH significant for ?endometriosis, recurrent sinus infections, ?gluten intolerance which has resolved on gluten exclusion.   Referred for eval of progressively elevated tryptase.     Had an anaphylactic reaction to a bee sting in '22 after which she was seen by allergy for the work up. Work up showed elevated tryptase which has progressively increased now over 20. KIT D816V negative.   Has had seasonal allergies in the past used to be a  with many stings but never had an anaphylactic reaction. Allergies have worsened since the last 2 yrs. Getting desensitization therapy for her anaphylaxis with weekly injections to environmental and bee allergens. During these events also takes Zyrtec, last was 1 week back.   Notes increased frequency of infections since the bee sting. None severe.     Notes fatigue, no B s/s. No new rash. No GI or  complaints.   Also got stung by a yellow jacket in October '23, on her tongue, notes confusion during these events, did not use her epipen immediately. Used IV steroids, benadryl and pepcid, then took epipen.   Other meds include quercitin, stinging keyur, vit B, D, omega 3 and magnesium.   Age appropriate cancer screening: last colo '21 was WNL follow up in 10yrs, mammo '23, pap '23.   FMH: identical twin sister had NHL age 20, she also has URI allergy like symptoms, father had small cell of unknown origin, small cell pat GM, mat GF lung cancer.   Social history: never smoker, no ETOH, no RDA.   02/01/24: notes achiness, day after allergy shot she is itchy, headache, joint pains and facial puffiness.     Review of Systems - Oncology  10 point review of systems negative except as stated in  "HPI    Objective   No past surgical history on file.    Family History   Problem Relation Name Age of Onset    Melanoma Mother      Lymphoma Sister      Lung cancer Maternal Grandmother      Lung cancer Paternal Grandfather       Oncology History    No history exists.       Physical Exam  Vitals reviewed.   Constitutional:       General: She is not in acute distress.     Appearance: Normal appearance. She is not toxic-appearing.   HENT:      Head: Normocephalic and atraumatic.   Eyes:      Comments: No pallor or icterus    Neck:      Comments: No cervical or axillary lymphadenopathy   Cardiovascular:      Rate and Rhythm: Normal rate and regular rhythm.      Heart sounds: No murmur heard.  Pulmonary:      Effort: Pulmonary effort is normal.      Breath sounds: Normal breath sounds.   Abdominal:      General: There is no distension.      Palpations: Abdomen is soft.      Tenderness: There is no abdominal tenderness.      Comments: No palpable hepatosplenomegaly    Musculoskeletal:      Comments: No pedal edema    Skin:     Comments: Has erythema over face, apparently this is her baseline with rosacea   Neurological:      General: No focal deficit present.      Mental Status: She is alert and oriented to person, place, and time.   Psychiatric:         Mood and Affect: Mood normal.       BSA: 1.9 meters squared  /71 (BP Location: Left arm, Patient Position: Sitting, BP Cuff Size: Large adult)   Pulse 79   Temp 36.2 °C (97.2 °F)   Resp 17   Wt 72.5 kg (159 lb 12.8 oz)   SpO2 100%   BMI 22.77 kg/m²     Labs:  Lab Results   Component Value Date    WBC 4.2 (L) 12/27/2023    NEUTROABS 2.49 12/27/2023    IGABSOL 0.00 12/27/2023    LYMPHSABS 1.33 12/27/2023    MONOSABS 0.31 12/27/2023    EOSABS 0.00 12/27/2023    BASOSABS 0.02 12/27/2023    RBC 4.83 12/27/2023    MCV 91 12/27/2023    MCHC 34.7 12/27/2023    HGB 15.3 12/27/2023    HCT 44.1 12/27/2023     12/27/2023     No results found for: \"RETICCTPCT\" " "  Lab Results   Component Value Date    CREATININE 0.81 07/19/2023    BUN 12 07/19/2023    EGFR 96 12/24/2020     07/19/2023    K 4.7 07/19/2023     07/19/2023    CO2 27 07/19/2023      Lab Results   Component Value Date    ALT 14 07/19/2023    AST 15 07/19/2023    ALKPHOS 61 07/19/2023    BILITOT 0.5 07/19/2023      Lab Results   Component Value Date    TSH 2.91 07/19/2023     Lab Results   Component Value Date    TSH 2.91 07/19/2023    THYROIDPAB <28 04/07/2023     No results found for: \"IRON\", \"TIBC\", \"FERRITIN\"   Lab Results   Component Value Date    FLDHXBRI86 1,005 (H) 12/29/2023      No results found for: \"FOLATE\"  No results found for: \"SHEILA\", \"RF\", \"SEDRATE\"   No results found for: \"CRP\"   No results found for: \"JERALD\"  No results found for: \"LDH\"  No results found for: \"HAPTOGLOBIN\"  No results found for: \"SPEP\"  No results found for: \"IGG\", \"IGM\", \"IGA\"     No components found for: \"PT\"  No results found for: \"APTT\"    BONE MARROW BIOPSY 12/27/23:  ADDENDUM: Tryptase (performed at [facility] and reviewed at [facility]): highlights few scattered round-to-oval mast cells; weak non-specific staining in some of the paratrabecular areas with spindled-shaped cells. CD25 (performed at [facility] and reviewed at [facility]): very few scattered positive cells.  Given the negative tryptase stain, it is likely that the foci of paratrabecular spindled-shaped cells are likely early granulocyte precursors with crush artifact.  PCR for KIT D816V was negative (see separate report for details).     Overall, there is no immunohistochemical or molecular evidence of mast cell neoplasm in the current sample. Clinical correlation is recommended.   -- LIMITED, NORMOCELLULAR BONE MARROW (30%-40%) WITH NORMAL TRILINEAGE HEMATOPOIESIS.  -- SMALL FOCUS OF PARATRABECULAR SPINDLED-SHAPED CELLS, SEE NOTE.     NOTE: The aspirate smears are paucispicular and the core biopsy limited due to considerable aspiration and hemorrhagic " disruption artifacts. There is no definitive morphologic evidence of dysplasia.     In addition,  immunostain highlights few paratrabecular spindled-shaped cells suspicious for mast cells. Confirmatory tryptase stain (send out) is pending and will be reported in an addendum. However, these aggregates are small and insufficient to fulfill the major criterion for systemic mastocytosis (PMID: 78183657). Myeloid NGS was negative for KIT mutation. High sensitivity PCR for KIT D816V mutation will be attempted and results reported in an addendum.      Bone Marrow Differential   Cell Type Value Reference Range  Promyelocytes 3.0 1-5 %  Myelocytes 4.0 5-10 %  Metamyelocytes 7.5 10-25 %  Bands 21.0 10-20 %  Segmented Neutrophils 17.0 5-30 %  Eosinophils 3.5 2-4 %  Basophils 1.0 0-1 %    Lymphocytes 9.5 5-25 %  Monocytes 0.0 0-2 %  Plasma Cells 2.0 0-2 %    Blasts 0.0 0-1 %   Total Erythroid 31.5 17-35 %  Total Cells Counted 200    Myeloid/Erythroid Ratio 1.8 1.5-4.1     Microscopic Description   PERIPHERAL SMEAR: Submitted              Red cells: Normal.        White cells: Normal.          Platelets: Normal, adequate.     ASPIRATE SMEAR: Submitted                          Specimen: Paucispicular.       Erythropoiesis: Normal.       Granulopoiesis: Normal.       Megakaryocytes: Present. Morphology: Normal.       Comments: No increase in mast cells.     TOUCH PREP: Submitted       Specimen: Suboptimal, Hypocellular.        Comments: Similar to aspirate smear.     ASPIRATE CLOT: Submitted                           Specimen: Spicular.       Cellularity: 30-40%             Estimated M:E ratio: Consistent with aspirate smear.       Megakaryocytes: Adequate. Morphology: Normal.       Granulomas: Absent.       Lymphoid aggregates: Absent.      CORE BIOPSY: Submitted                            Specimen: Limited due to marked aspiration and hemorrhagic artifacts.       Cellularity: estimated at 40%       Estimated M:E ratio:  Consistent with aspirate smear.       Bony trabeculae: Normal.       Megakaryocytes: Adequate. Morphology: Normal.         Granulomas: Absent.       Lymphoid aggregates: Absent.       Comment: very small paratrabecular focus with few spindled cells.     SPECIAL STAINS:         Iron: no stainable iron seen; no ring sideroblasts identified.     IMMUNOHISTOCHEMISTRY:  A1: : highlights mast cells of predominantly normal morphology with possible rare spindled forms.      B1:         : highlights rare paratrabecular foci with spindled-shaped cells (up to 8 cells).                CD3: highlights small T lymphoid cells.                CD2: appear negative in spindled-shaped cells.                CD30: rare weak positivity in some spindled-shaped cells.                Tryptase: send out, will be reported in an addendum.                CD25: send out, will be reported in an addendum.     FLOW CYTOMETRY:   No immunophenotypic evidence of a lymphoproliferative disorder.   No increased or abnormal blast population.   No definitive abnormality of granulocytes, monocytes or red cells.  No discrete mast cell population identified.    Karyotype: 46,XX[6] FEMALE  KIT c.2447 A>T (p.D816V): mutation was not detected in this Bone Marrow specimen.   DISEASE ASSOCIATED GENOMIC FINDINGS: Not Detected.  INTERPRETATION: No variants are detected in the listed gene regions.  This finding does not exclude the presence of genetic alterations present in gene regions not tested or occurring at a frequency below the limit of detection.  DISEASE RELEVANT ALTERATIONS NOT DETECTED: Negative for KIT mutation.    Assessment/Plan    48 y.o. female with a PMH significant for rosacea, ?endometriosis, recurrent sinus infections, ?gluten intolerance which has resolved on gluten exclusion.   Referred for eval of progressively elevated tryptase.    # Elevated tryptase: Briefly her recent bout of medical issues started with an anaphylactic reaction to  a bee sting, after which she started desensitization therapy and most recently was stung in the mouth by a yellow jacket. The pt has had progressively elevated tryptase (now >20), notably with repeated exposure and while being symptomatic from her allergies with KIT D816V negative. Since our last meeting the pt took a break from the allergen injections and had an almost total resolution of her symptoms.   Her workup to date has shown decreased tryptase to ~15, otherwise normal CBC, exam also not suggestive of organomegaly. We proceeded with BMBx which has ruled out evidence of mastocytosis and does not point to eosinophilia. NGS is also negative for same. Does not appear to have hematological basis for disease. Also apparently saw dermatology and ruled out cutaneous mastocytosis. She took her injections last week and her symptoms are back. The timeline is suggestive of correlation to the injections.   Plan:   - pt to follow up with her immunologist     Jose Song MD

## 2024-02-07 ENCOUNTER — APPOINTMENT (OUTPATIENT)
Dept: ALLERGY | Facility: CLINIC | Age: 49
End: 2024-02-07
Payer: COMMERCIAL

## 2024-02-14 ENCOUNTER — APPOINTMENT (OUTPATIENT)
Dept: ALLERGY | Facility: CLINIC | Age: 49
End: 2024-02-14
Payer: COMMERCIAL

## 2024-02-19 ENCOUNTER — APPOINTMENT (OUTPATIENT)
Dept: HEMATOLOGY/ONCOLOGY | Facility: HOSPITAL | Age: 49
End: 2024-02-19
Payer: COMMERCIAL

## 2024-02-21 ENCOUNTER — APPOINTMENT (OUTPATIENT)
Dept: ALLERGY | Facility: CLINIC | Age: 49
End: 2024-02-21
Payer: COMMERCIAL

## 2024-02-28 ENCOUNTER — CLINICAL SUPPORT (OUTPATIENT)
Dept: ALLERGY | Facility: CLINIC | Age: 49
End: 2024-02-28
Payer: COMMERCIAL

## 2024-02-28 DIAGNOSIS — T63.444A TOXIC EFFECT OF VENOM OF BEES, UNDETERMINED INTENT, INITIAL ENCOUNTER: ICD-10-CM

## 2024-02-28 PROCEDURE — 95117 IMMUNOTHERAPY INJECTIONS: CPT | Performed by: ALLERGY & IMMUNOLOGY

## 2024-04-03 ENCOUNTER — OFFICE VISIT (OUTPATIENT)
Dept: ALLERGY | Facility: CLINIC | Age: 49
End: 2024-04-03
Payer: COMMERCIAL

## 2024-04-03 DIAGNOSIS — T63.451A TOXIC EFFECT OF VENOM OF HORNETS, UNINTENTIONAL, INITIAL ENCOUNTER: Primary | ICD-10-CM

## 2024-04-03 PROCEDURE — 95117 IMMUNOTHERAPY INJECTIONS: CPT | Performed by: ALLERGY & IMMUNOLOGY

## 2024-04-10 ENCOUNTER — APPOINTMENT (OUTPATIENT)
Dept: ALLERGY | Facility: CLINIC | Age: 49
End: 2024-04-10
Payer: COMMERCIAL

## 2024-05-01 ENCOUNTER — OFFICE VISIT (OUTPATIENT)
Dept: ALLERGY | Facility: CLINIC | Age: 49
End: 2024-05-01
Payer: COMMERCIAL

## 2024-05-01 DIAGNOSIS — J30.89 ALLERGIC RHINITIS DUE TO OTHER ALLERGIC TRIGGER, UNSPECIFIED SEASONALITY: Primary | ICD-10-CM

## 2024-05-01 PROCEDURE — 95117 IMMUNOTHERAPY INJECTIONS: CPT | Performed by: ALLERGY & IMMUNOLOGY

## 2024-05-28 NOTE — PROGRESS NOTES
See Allergy Shot Immunotherapy Record Book  Allergy checklist done, no concerns                  Subjective   Patient ID:   [id number]   Qi Pearce is a 48 y.o. female who presents for No chief complaint on file..    No chief complaint on file.         HPI  This patient is here to evaluate for:      Review of Systems      Objective     There were no vitals taken for this visit.     Physical Exam       Current Outpatient Medications   Medication Sig Dispense Refill    cholecalciferol (Vitamin D-3) 25 MCG (1000 UT) capsule Take by mouth.      cyanocobalamin, vitamin B-12, (VITAMIN B-12 ORAL) Take by mouth.      docosahexaenoic acid/epa (FISH OIL ORAL) Fish Oil CAPS   Refills: 0       Active      EPINEPHrine (Epipen) 0.3 mg/0.3 mL injection syringe Inject 0.3 mL (0.3 mg) into the muscle if needed for anaphylaxis. Inject into UPPER, OUTER THIGH. Call 911 after use. 2 each 3    EPINEPHrine 0.3 mg/0.3 mL injection syringe Inject into upper leg. Call 911 after use.      quercetin 500 mg capsule Take 1 tablet by mouth 2 times a day.       No current facility-administered medications for this visit.       Summary of the labs over the past 6 months:    Lab on 12/29/2023   Component Date Value Ref Range Status    TSI 12/29/2023 <1.0  <=1.3 TSI index Final    Triiodothyronine, Free 12/29/2023 2.9  2.3 - 4.2 pg/mL Final    Thyroxine, Free 12/29/2023 0.76  0.61 - 1.12 ng/dL Final    T3, Reverse 12/29/2023 13.6  9.0 - 27.0 ng/dL Final    Vitamin B12 12/29/2023 1,005 (H)  211 - 911 pg/mL Final   Hospital Outpatient Visit on 12/27/2023   Component Date Value Ref Range Status    Case Report 12/27/2023    Final                    Value:Surgical Pathology                                Case: [id number]                                  Authorizing Provider:  Jose Archer MD        Collected:           12/27/2023 0980              Ordering Location:     [facility]   Received:            12/27/2023 0907               Pathologist:           Shahram Blevins MD                                                         Specimens:   A) - BONE MARROW CLOT, LEFT ILIAC CREST                                                             B) - BONE MARROW CORE BIOPSY, LEFT ILIAC CREST                                                      C) - BONE MARROW ASPIRATE, LEFT ILIAC CREST                                                [employer] Report 12/27/2023    Final                    Value:This result contains rich text formatting which cannot be displayed here.    FINAL DIAGNOSIS 12/27/2023    Final                    Value:This result contains rich text formatting which cannot be displayed here.    Bone Marrow Differential 12/27/2023    Final                    Value:This result contains rich text formatting which cannot be displayed here.    Microscopic Description 12/27/2023    Final                    Value:This result contains rich text formatting which cannot be displayed here.    Gross Description 12/27/2023    Final                    Value:This result contains rich text formatting which cannot be displayed here.    Case Report 12/27/2023    Final                    Value:Flow Cytometry                                    Case: [id number]                                   Authorizing Provider:  Jose Archer MD        Collected:           12/27/2023 0938              Ordering Location[facility]     [facility]   Received:            12/27/2023 0939              Pathologist:           Shahram Blevins MD                                                         Specimen:    Bone Marrow Aspirate                                                                       Diagnosis 12/27/2023    Final                    Value:This result contains rich text formatting which cannot be displayed here.      12/27/2023    Final                    Value:This result contains rich text formatting which cannot be displayed here.     Flow Differential 12/27/2023    Final                    Value:This result contains rich text formatting which cannot be displayed here.    Flow Test Ordered 12/27/2023 Acute Panel  not established Final    Specimen Viability 12/27/2023 Acceptable  not established Final    Cell Count 12/27/2023 3.90  not established x10*3/uL Final    Number of Cells Collected 12/27/2023    Final    Methodology 12/27/2023    Final                    Value:This result contains rich text formatting which cannot be displayed here.    WBC 12/27/2023 4.2 (L)  4.4 - 11.3 x10*3/uL Final    nRBC 12/27/2023 0.0  0.0 - 0.0 /100 WBCs Final    RBC 12/27/2023 4.83  4.00 - 5.20 x10*6/uL Final    Hemoglobin 12/27/2023 15.3  12.0 - 16.0 g/dL Final    Hematocrit 12/27/2023 44.1  36.0 - 46.0 % Final    MCV 12/27/2023 91  80 - 100 fL Final    MCH 12/27/2023 31.7  26.0 - 34.0 pg Final    MCHC 12/27/2023 34.7  32.0 - 36.0 g/dL Final    RDW 12/27/2023 12.6  11.5 - 14.5 % Final    Platelets 12/27/2023 223  150 - 450 x10*3/uL Final    Neutrophils % 12/27/2023 60.0  40.0 - 80.0 % Final    Immature Granulocytes %, Automated 12/27/2023 0.0  0.0 - 0.9 % Final    Lymphocytes % 12/27/2023 32.0  13.0 - 44.0 % Final    Monocytes % 12/27/2023 7.5  2.0 - 10.0 % Final    Eosinophils % 12/27/2023 0.0  0.0 - 6.0 % Final    Basophils % 12/27/2023 0.5  0.0 - 2.0 % Final    Neutrophils Absolute 12/27/2023 2.49  1.20 - 7.70 x10*3/uL Final    Immature Granulocytes Absolute, Au* 12/27/2023 0.00  0.00 - 0.70 x10*3/uL Final    Lymphocytes Absolute 12/27/2023 1.33  1.20 - 4.80 x10*3/uL Final    Monocytes Absolute 12/27/2023 0.31  0.10 - 1.00 x10*3/uL Final    Eosinophils Absolute 12/27/2023 0.00  0.00 - 0.70 x10*3/uL Final    Basophils Absolute 12/27/2023 0.02  0.00 - 0.10 x10*3/uL Final    Cytogenetics Interpretation 12/27/2023    Final                    Value:This result contains rich text formatting which cannot be displayed here.    Cells Counted 12/27/2023 6  cells Final     Cells Analyzed 12/27/2023 6  cells Final    Cells Imaged 12/27/2023 4  cells Final    Cells Karyotyped 12/27/2023 2  cells Final    Modal Chromosome # 12/27/2023 46  chromosomes Final    Hypomodal Chromosome # 12/27/2023 0  cells Final    Hypermodal Chromosome # 12/27/2023 0  cells Final    Staining Method 12/27/2023 GTG   Final    Band Resolution 12/27/2023 400  bands Final    Electronically signed and reported* 12/27/2023    Final                    Value:This result contains rich text formatting which cannot be displayed here.    Myeloid Malignancies Results 12/27/2023    Final                    Value:This result contains rich text formatting which cannot be displayed here.    Electronically signed and reported* 12/27/2023    Final                    Value:Myriam Terrazas MD PhD     KIT D816V Mutation Results 12/27/2023 Not Detected  Not Detected Final    KIT D816V Interpretation 12/27/2023    Final                    Value:This result contains rich text formatting which cannot be displayed here.    Electronically signed and reported* 12/27/2023    Final                    Value:Rosy Morales MD   Lab on 12/14/2023   Component Date Value Ref Range Status    Tryptase 12/14/2023 14.6 (H)  <=10.9 ug/L Final   Lab on 12/06/2023   Component Date Value Ref Range Status    Tryptase 12/06/2023 13.1 (H)  <=10.9 ug/L Final   Lab on 12/06/2023   Component Date Value Ref Range Status    Tryptase 12/06/2023 13.7 (H)  <=10.9 ug/L Final   Lab on 11/20/2023   Component Date Value Ref Range Status    WBC 11/20/2023 7.3  4.4 - 11.3 x10*3/uL Final    nRBC 11/20/2023 0.0  0.0 - 0.0 /100 WBCs Final    RBC 11/20/2023 4.58  4.00 - 5.20 x10*6/uL Final    Hemoglobin 11/20/2023 14.6  12.0 - 16.0 g/dL Final    Hematocrit 11/20/2023 42.1  36.0 - 46.0 % Final    MCV 11/20/2023 92  80 - 100 fL Final    MCH 11/20/2023 31.9  26.0 - 34.0 pg Final    MCHC 11/20/2023 34.7  32.0 - 36.0 g/dL Final    RDW 11/20/2023 12.5  11.5 - 14.5 % Final     Platelets 11/20/2023 309  150 - 450 x10*3/uL Final    Neutrophils % 11/20/2023 73.2  40.0 - 80.0 % Final    Immature Granulocytes %, Automated 11/20/2023 0.4  0.0 - 0.9 % Final    Lymphocytes % 11/20/2023 19.9  13.0 - 44.0 % Final    Monocytes % 11/20/2023 6.1  2.0 - 10.0 % Final    Eosinophils % 11/20/2023 0.0  0.0 - 6.0 % Final    Basophils % 11/20/2023 0.4  0.0 - 2.0 % Final    Neutrophils Absolute 11/20/2023 5.35  1.20 - 7.70 x10*3/uL Final    Immature Granulocytes Absolute, Au* 11/20/2023 0.03  0.00 - 0.70 x10*3/uL Final    Lymphocytes Absolute 11/20/2023 1.46  1.20 - 4.80 x10*3/uL Final    Monocytes Absolute 11/20/2023 0.45  0.10 - 1.00 x10*3/uL Final    Eosinophils Absolute 11/20/2023 0.00  0.00 - 0.70 x10*3/uL Final    Basophils Absolute 11/20/2023 0.03  0.00 - 0.10 x10*3/uL Final    Tryptase 11/20/2023 15.3 (H)  <=10.9 ug/L Final    Extra Tube 11/20/2023 Hold for add-ons.   Final   Office Visit on 11/20/2023   Component Date Value Ref Range Status    Average RNFL Baseline (OS) 11/20/2023 78   Final    Average RNFL Baseline (OD) 11/20/2023 79   Final         Assessment/Plan           Naren Vanessa MD

## 2024-06-05 ENCOUNTER — OFFICE VISIT (OUTPATIENT)
Dept: ALLERGY | Facility: CLINIC | Age: 49
End: 2024-06-05
Payer: COMMERCIAL

## 2024-06-05 DIAGNOSIS — J30.89 ALLERGIC RHINITIS DUE TO OTHER ALLERGIC TRIGGER, UNSPECIFIED SEASONALITY: Primary | ICD-10-CM

## 2024-06-05 PROCEDURE — 95117 IMMUNOTHERAPY INJECTIONS: CPT | Performed by: ALLERGY & IMMUNOLOGY

## 2024-06-05 NOTE — PROGRESS NOTES
See Allergy Shot Immunotherapy Record Book  Allergy checklist done, no concerns                  Subjective   Patient ID:   [id number]   Qi Pearce is a 48 y.o. female who presents for No chief complaint on file..    No chief complaint on file.         HPI  This patient is here to evaluate for:      Review of Systems      Objective     There were no vitals taken for this visit.     Physical Exam       Current Outpatient Medications   Medication Sig Dispense Refill    cholecalciferol (Vitamin D-3) 25 MCG (1000 UT) capsule Take by mouth.      cyanocobalamin, vitamin B-12, (VITAMIN B-12 ORAL) Take by mouth.      docosahexaenoic acid/epa (FISH OIL ORAL) Fish Oil CAPS   Refills: 0       Active      EPINEPHrine (Epipen) 0.3 mg/0.3 mL injection syringe Inject 0.3 mL (0.3 mg) into the muscle if needed for anaphylaxis. Inject into UPPER, OUTER THIGH. Call 911 after use. 2 each 3    EPINEPHrine 0.3 mg/0.3 mL injection syringe Inject into upper leg. Call 911 after use.      quercetin 500 mg capsule Take 1 tablet by mouth 2 times a day.       No current facility-administered medications for this visit.       Summary of the labs over the past 6 months:    Lab on 12/29/2023   Component Date Value Ref Range Status    TSI 12/29/2023 <1.0  <=1.3 TSI index Final    Triiodothyronine, Free 12/29/2023 2.9  2.3 - 4.2 pg/mL Final    Thyroxine, Free 12/29/2023 0.76  0.61 - 1.12 ng/dL Final    T3, Reverse 12/29/2023 13.6  9.0 - 27.0 ng/dL Final    Vitamin B12 12/29/2023 1,005 (H)  211 - 911 pg/mL Final   Hospital Outpatient Visit on 12/27/2023   Component Date Value Ref Range Status    Case Report 12/27/2023    Final                    Value:Surgical Pathology                                Case: [id number]                                  Authorizing Provider:  Jose Archer MD        Collected:           12/27/2023 0960              Ordering Location:     [facility]   Received:            12/27/2023 0953               Pathologist:           Shahram Blevins MD                                                         Specimens:   A) - BONE MARROW CLOT, LEFT ILIAC CREST                                                             B) - BONE MARROW CORE BIOPSY, LEFT ILIAC CREST                                                      C) - BONE MARROW ASPIRATE, LEFT ILIAC CREST                                                Consolidated Theranostic Report 12/27/2023    Final                    Value:This result contains rich text formatting which cannot be displayed here.    FINAL DIAGNOSIS 12/27/2023    Final                    Value:This result contains rich text formatting which cannot be displayed here.    Bone Marrow Differential 12/27/2023    Final                    Value:This result contains rich text formatting which cannot be displayed here.    Microscopic Description 12/27/2023    Final                    Value:This result contains rich text formatting which cannot be displayed here.    Gross Description 12/27/2023    Final                    Value:This result contains rich text formatting which cannot be displayed here.    Case Report 12/27/2023    Final                    Value:Flow Cytometry                                    Case: [id number]                                   Authorizing Provider:  Jose Archer MD        Collected:           12/27/2023 0938              Ordering Location:     [facility]   Received:            12/27/2023 0939              Pathologist:           Shahram Blevins MD                                                         Specimen:    Bone Marrow Aspirate                                                                       Diagnosis 12/27/2023    Final                    Value:This result contains rich text formatting which cannot be displayed here.      12/27/2023    Final                    Value:This result contains rich text formatting which cannot be displayed here.     Flow Differential 12/27/2023    Final                    Value:This result contains rich text formatting which cannot be displayed here.    Flow Test Ordered 12/27/2023 Acute Panel  not established Final    Specimen Viability 12/27/2023 Acceptable  not established Final    Cell Count 12/27/2023 3.90  not established x10*3/uL Final    Number of Cells Collected 12/27/2023    Final    Methodology 12/27/2023    Final                    Value:This result contains rich text formatting which cannot be displayed here.    WBC 12/27/2023 4.2 (L)  4.4 - 11.3 x10*3/uL Final    nRBC 12/27/2023 0.0  0.0 - 0.0 /100 WBCs Final    RBC 12/27/2023 4.83  4.00 - 5.20 x10*6/uL Final    Hemoglobin 12/27/2023 15.3  12.0 - 16.0 g/dL Final    Hematocrit 12/27/2023 44.1  36.0 - 46.0 % Final    MCV 12/27/2023 91  80 - 100 fL Final    MCH 12/27/2023 31.7  26.0 - 34.0 pg Final    MCHC 12/27/2023 34.7  32.0 - 36.0 g/dL Final    RDW 12/27/2023 12.6  11.5 - 14.5 % Final    Platelets 12/27/2023 223  150 - 450 x10*3/uL Final    Neutrophils % 12/27/2023 60.0  40.0 - 80.0 % Final    Immature Granulocytes %, Automated 12/27/2023 0.0  0.0 - 0.9 % Final    Lymphocytes % 12/27/2023 32.0  13.0 - 44.0 % Final    Monocytes % 12/27/2023 7.5  2.0 - 10.0 % Final    Eosinophils % 12/27/2023 0.0  0.0 - 6.0 % Final    Basophils % 12/27/2023 0.5  0.0 - 2.0 % Final    Neutrophils Absolute 12/27/2023 2.49  1.20 - 7.70 x10*3/uL Final    Immature Granulocytes Absolute, Au* 12/27/2023 0.00  0.00 - 0.70 x10*3/uL Final    Lymphocytes Absolute 12/27/2023 1.33  1.20 - 4.80 x10*3/uL Final    Monocytes Absolute 12/27/2023 0.31  0.10 - 1.00 x10*3/uL Final    Eosinophils Absolute 12/27/2023 0.00  0.00 - 0.70 x10*3/uL Final    Basophils Absolute 12/27/2023 0.02  0.00 - 0.10 x10*3/uL Final    Cytogenetics Interpretation 12/27/2023    Final                    Value:This result contains rich text formatting which cannot be displayed here.    Cells Counted 12/27/2023 6  cells Final     Cells Analyzed 12/27/2023 6  cells Final    Cells Imaged 12/27/2023 4  cells Final    Cells Karyotyped 12/27/2023 2  cells Final    Modal Chromosome # 12/27/2023 46  chromosomes Final    Hypomodal Chromosome # 12/27/2023 0  cells Final    Hypermodal Chromosome # 12/27/2023 0  cells Final    Staining Method 12/27/2023 GTG   Final    Band Resolution 12/27/2023 400  bands Final    Electronically signed and reported* 12/27/2023    Final                    Value:This result contains rich text formatting which cannot be displayed here.    Myeloid Malignancies Results 12/27/2023    Final                    Value:This result contains rich text formatting which cannot be displayed here.    Electronically signed and reported* 12/27/2023    Final                    Value:Myriam Terrazas MD PhD     KIT D816V Mutation Results 12/27/2023 Not Detected  Not Detected Final    KIT D816V Interpretation 12/27/2023    Final                    Value:This result contains rich text formatting which cannot be displayed here.    Electronically signed and reported* 12/27/2023    Final                    Value:Rosy Morales MD   Lab on 12/14/2023   Component Date Value Ref Range Status    Tryptase 12/14/2023 14.6 (H)  <=10.9 ug/L Final   Lab on 12/06/2023   Component Date Value Ref Range Status    Tryptase 12/06/2023 13.1 (H)  <=10.9 ug/L Final   Lab on 12/06/2023   Component Date Value Ref Range Status    Tryptase 12/06/2023 13.7 (H)  <=10.9 ug/L Final   Lab on 11/20/2023   Component Date Value Ref Range Status    WBC 11/20/2023 7.3  4.4 - 11.3 x10*3/uL Final    nRBC 11/20/2023 0.0  0.0 - 0.0 /100 WBCs Final    RBC 11/20/2023 4.58  4.00 - 5.20 x10*6/uL Final    Hemoglobin 11/20/2023 14.6  12.0 - 16.0 g/dL Final    Hematocrit 11/20/2023 42.1  36.0 - 46.0 % Final    MCV 11/20/2023 92  80 - 100 fL Final    MCH 11/20/2023 31.9  26.0 - 34.0 pg Final    MCHC 11/20/2023 34.7  32.0 - 36.0 g/dL Final    RDW 11/20/2023 12.5  11.5 - 14.5 % Final     Platelets 11/20/2023 309  150 - 450 x10*3/uL Final    Neutrophils % 11/20/2023 73.2  40.0 - 80.0 % Final    Immature Granulocytes %, Automated 11/20/2023 0.4  0.0 - 0.9 % Final    Lymphocytes % 11/20/2023 19.9  13.0 - 44.0 % Final    Monocytes % 11/20/2023 6.1  2.0 - 10.0 % Final    Eosinophils % 11/20/2023 0.0  0.0 - 6.0 % Final    Basophils % 11/20/2023 0.4  0.0 - 2.0 % Final    Neutrophils Absolute 11/20/2023 5.35  1.20 - 7.70 x10*3/uL Final    Immature Granulocytes Absolute, Au* 11/20/2023 0.03  0.00 - 0.70 x10*3/uL Final    Lymphocytes Absolute 11/20/2023 1.46  1.20 - 4.80 x10*3/uL Final    Monocytes Absolute 11/20/2023 0.45  0.10 - 1.00 x10*3/uL Final    Eosinophils Absolute 11/20/2023 0.00  0.00 - 0.70 x10*3/uL Final    Basophils Absolute 11/20/2023 0.03  0.00 - 0.10 x10*3/uL Final    Tryptase 11/20/2023 15.3 (H)  <=10.9 ug/L Final    Extra Tube 11/20/2023 Hold for add-ons.   Final   Office Visit on 11/20/2023   Component Date Value Ref Range Status    Average RNFL Baseline (OS) 11/20/2023 78   Final    Average RNFL Baseline (OD) 11/20/2023 79   Final         Assessment/Plan           Naren Vanessa MD

## 2024-06-08 NOTE — PROGRESS NOTES
See Allergy Shot Immunotherapy Record Book  Allergy checklist done, no concerns                  Subjective   Patient ID:   [id number]   Qi Pearce is a 48 y.o. female who presents for No chief complaint on file..    No chief complaint on file.         HPI  This patient is here to evaluate for:      Review of Systems      Objective     There were no vitals taken for this visit.     Physical Exam       Current Outpatient Medications   Medication Sig Dispense Refill    cholecalciferol (Vitamin D-3) 25 MCG (1000 UT) capsule Take by mouth.      cyanocobalamin, vitamin B-12, (VITAMIN B-12 ORAL) Take by mouth.      docosahexaenoic acid/epa (FISH OIL ORAL) Fish Oil CAPS   Refills: 0       Active      EPINEPHrine (Epipen) 0.3 mg/0.3 mL injection syringe Inject 0.3 mL (0.3 mg) into the muscle if needed for anaphylaxis. Inject into UPPER, OUTER THIGH. Call 911 after use. 2 each 3    EPINEPHrine 0.3 mg/0.3 mL injection syringe Inject into upper leg. Call 911 after use.      quercetin 500 mg capsule Take 1 tablet by mouth 2 times a day.       No current facility-administered medications for this visit.       Summary of the labs over the past 6 months:    Lab on 12/29/2023   Component Date Value Ref Range Status    TSI 12/29/2023 <1.0  <=1.3 TSI index Final    Triiodothyronine, Free 12/29/2023 2.9  2.3 - 4.2 pg/mL Final    Thyroxine, Free 12/29/2023 0.76  0.61 - 1.12 ng/dL Final    T3, Reverse 12/29/2023 13.6  9.0 - 27.0 ng/dL Final    Vitamin B12 12/29/2023 1,005 (H)  211 - 911 pg/mL Final   Hospital Outpatient Visit on 12/27/2023   Component Date Value Ref Range Status    Case Report 12/27/2023    Final                    Value:Surgical Pathology                                Case: [id number]                                  Authorizing Provider:  Jose Archer MD        Collected:           12/27/2023 0945              Ordering Location:     [facility]   Received:            12/27/2023 0980               Pathologist:           Shahram Blevins MD                                                         Specimens:   A) - BONE MARROW CLOT, LEFT ILIAC CREST                                                             B) - BONE MARROW CORE BIOPSY, LEFT ILIAC CREST                                                      C) - BONE MARROW ASPIRATE, LEFT ILIAC CREST                                                Consolidated Theranostic Report 12/27/2023    Final                    Value:This result contains rich text formatting which cannot be displayed here.    FINAL DIAGNOSIS 12/27/2023    Final                    Value:This result contains rich text formatting which cannot be displayed here.    Bone Marrow Differential 12/27/2023    Final                    Value:This result contains rich text formatting which cannot be displayed here.    Microscopic Description 12/27/2023    Final                    Value:This result contains rich text formatting which cannot be displayed here.    Gross Description 12/27/2023    Final                    Value:This result contains rich text formatting which cannot be displayed here.    Case Report 12/27/2023    Final                    Value:Flow Cytometry                                    Case: [id number]                                   Authorizing Provider:  Jose Archer MD        Collected:           12/27/2023 0938              Ordering Location:     [facility]   Received:            12/27/2023 0939              Pathologist:           Shahram lBevins MD                                                         Specimen:    Bone Marrow Aspirate                                                                       Diagnosis 12/27/2023    Final                    Value:This result contains rich text formatting which cannot be displayed here.      12/27/2023    Final                    Value:This result contains rich text formatting which cannot be displayed here.     Flow Differential 12/27/2023    Final                    Value:This result contains rich text formatting which cannot be displayed here.    Flow Test Ordered 12/27/2023 Acute Panel  not established Final    Specimen Viability 12/27/2023 Acceptable  not established Final    Cell Count 12/27/2023 3.90  not established x10*3/uL Final    Number of Cells Collected 12/27/2023    Final    Methodology 12/27/2023    Final                    Value:This result contains rich text formatting which cannot be displayed here.    WBC 12/27/2023 4.2 (L)  4.4 - 11.3 x10*3/uL Final    nRBC 12/27/2023 0.0  0.0 - 0.0 /100 WBCs Final    RBC 12/27/2023 4.83  4.00 - 5.20 x10*6/uL Final    Hemoglobin 12/27/2023 15.3  12.0 - 16.0 g/dL Final    Hematocrit 12/27/2023 44.1  36.0 - 46.0 % Final    MCV 12/27/2023 91  80 - 100 fL Final    MCH 12/27/2023 31.7  26.0 - 34.0 pg Final    MCHC 12/27/2023 34.7  32.0 - 36.0 g/dL Final    RDW 12/27/2023 12.6  11.5 - 14.5 % Final    Platelets 12/27/2023 223  150 - 450 x10*3/uL Final    Neutrophils % 12/27/2023 60.0  40.0 - 80.0 % Final    Immature Granulocytes %, Automated 12/27/2023 0.0  0.0 - 0.9 % Final    Lymphocytes % 12/27/2023 32.0  13.0 - 44.0 % Final    Monocytes % 12/27/2023 7.5  2.0 - 10.0 % Final    Eosinophils % 12/27/2023 0.0  0.0 - 6.0 % Final    Basophils % 12/27/2023 0.5  0.0 - 2.0 % Final    Neutrophils Absolute 12/27/2023 2.49  1.20 - 7.70 x10*3/uL Final    Immature Granulocytes Absolute, Au* 12/27/2023 0.00  0.00 - 0.70 x10*3/uL Final    Lymphocytes Absolute 12/27/2023 1.33  1.20 - 4.80 x10*3/uL Final    Monocytes Absolute 12/27/2023 0.31  0.10 - 1.00 x10*3/uL Final    Eosinophils Absolute 12/27/2023 0.00  0.00 - 0.70 x10*3/uL Final    Basophils Absolute 12/27/2023 0.02  0.00 - 0.10 x10*3/uL Final    Cytogenetics Interpretation 12/27/2023    Final                    Value:This result contains rich text formatting which cannot be displayed here.    Cells Counted 12/27/2023 6  cells Final     Cells Analyzed 12/27/2023 6  cells Final    Cells Imaged 12/27/2023 4  cells Final    Cells Karyotyped 12/27/2023 2  cells Final    Modal Chromosome # 12/27/2023 46  chromosomes Final    Hypomodal Chromosome # 12/27/2023 0  cells Final    Hypermodal Chromosome # 12/27/2023 0  cells Final    Staining Method 12/27/2023 GTG   Final    Band Resolution 12/27/2023 400  bands Final    Electronically signed and reported* 12/27/2023    Final                    Value:This result contains rich text formatting which cannot be displayed here.    Myeloid Malignancies Results 12/27/2023    Final                    Value:This result contains rich text formatting which cannot be displayed here.    Electronically signed and reported* 12/27/2023    Final                    Value:Myriam Terrazas MD PhD     KIT D816V Mutation Results 12/27/2023 Not Detected  Not Detected Final    KIT D816V Interpretation 12/27/2023    Final                    Value:This result contains rich text formatting which cannot be displayed here.    Electronically signed and reported* 12/27/2023    Final                    Value:Rosy Morales MD   Lab on 12/14/2023   Component Date Value Ref Range Status    Tryptase 12/14/2023 14.6 (H)  <=10.9 ug/L Final   Lab on 12/06/2023   Component Date Value Ref Range Status    Tryptase 12/06/2023 13.1 (H)  <=10.9 ug/L Final   Lab on 12/06/2023   Component Date Value Ref Range Status    Tryptase 12/06/2023 13.7 (H)  <=10.9 ug/L Final         Assessment/Plan           Naren Vanessa MD

## 2024-06-15 ENCOUNTER — APPOINTMENT (OUTPATIENT)
Dept: OPHTHALMOLOGY | Facility: CLINIC | Age: 49
End: 2024-06-15
Payer: COMMERCIAL

## 2024-06-15 DIAGNOSIS — H52.223 REGULAR ASTIGMATISM OF BOTH EYES: Primary | ICD-10-CM

## 2024-06-15 DIAGNOSIS — H53.10 SUBJECTIVE VISUAL DISTURBANCE: ICD-10-CM

## 2024-06-15 DIAGNOSIS — H52.4 PRESBYOPIA: ICD-10-CM

## 2024-06-15 DIAGNOSIS — H52.13 MYOPIA OF BOTH EYES: ICD-10-CM

## 2024-06-15 LAB
AVERAGE RNFL BASELINE (OD): 79
AVERAGE RNFL BASELINE (OS): 78
AVERAGE RNFL TODAY (OD): 78
AVERAGE RNFL TODAY (OS): 77

## 2024-06-15 ASSESSMENT — REFRACTION_MANIFEST
OD_SPHERE: -4.00
OD_AXIS: 003
OS_SPHERE: -5.00
OD_CYLINDER: -3.00
OS_CYLINDER: -5.00
OD_ADD: +1.25
OS_AXIS: 177
OS_ADD: +1.25

## 2024-06-15 ASSESSMENT — VISUAL ACUITY
VA_OR_OD_CURRENT_RX: 20/20
CORRECTION_TYPE: CONTACTS
OD_CC: 20/20
METHOD: SNELLEN - LINEAR
VA_OR_OS_CURRENT_RX: 20/20
METHOD: SNELLEN - LINEAR
OS_CC: 20/20
CORRECTION_TYPE: CONTACTS

## 2024-06-15 ASSESSMENT — ENCOUNTER SYMPTOMS
NEUROLOGICAL NEGATIVE: 0
GASTROINTESTINAL NEGATIVE: 0
PSYCHIATRIC NEGATIVE: 0
EYES NEGATIVE: 0
CONSTITUTIONAL NEGATIVE: 0
CARDIOVASCULAR NEGATIVE: 0
HEMATOLOGIC/LYMPHATIC NEGATIVE: 0
MUSCULOSKELETAL NEGATIVE: 0
ENDOCRINE NEGATIVE: 0
ALLERGIC/IMMUNOLOGIC NEGATIVE: 0
RESPIRATORY NEGATIVE: 0

## 2024-06-15 ASSESSMENT — TONOMETRY
OD_IOP_MMHG: 11
OS_IOP_MMHG: 12
IOP_METHOD: GOLDMANN APPLANATION

## 2024-06-15 ASSESSMENT — REFRACTION_WEARINGRX
OD_SPHERE: -4.75
OS_AXIS: 180
OS_SPHERE: -5.25
OS_ADD: +1.25
OD_AXIS: 177
OS_CYLINDER: -5.50
OD_ADD: +1.25
OD_CYLINDER: -2.75

## 2024-06-15 ASSESSMENT — REFRACTION_CURRENTRX
OS_BRAND: ESSILOR BITORIC
OD_DIAMETER: 9.0
OS_DIAMETER: 9.0
OD_BRAND: ESSILOR BITORIC
OD_ADDL_SPECS: 02/2022

## 2024-06-15 ASSESSMENT — CONF VISUAL FIELD
OD_NORMAL: 1
METHOD: COUNTING FINGERS
OD_SUPERIOR_TEMPORAL_RESTRICTION: 0
OD_SUPERIOR_NASAL_RESTRICTION: 0
OD_INFERIOR_NASAL_RESTRICTION: 0
OS_INFERIOR_NASAL_RESTRICTION: 0
OS_SUPERIOR_TEMPORAL_RESTRICTION: 0
OD_INFERIOR_TEMPORAL_RESTRICTION: 0
OS_SUPERIOR_NASAL_RESTRICTION: 0
OS_NORMAL: 1
OS_INFERIOR_TEMPORAL_RESTRICTION: 0

## 2024-06-15 ASSESSMENT — CUP TO DISC RATIO
OS_RATIO: 0.2
OD_RATIO: 0.2

## 2024-06-15 ASSESSMENT — EXTERNAL EXAM - RIGHT EYE: OD_EXAM: NORMAL

## 2024-06-15 ASSESSMENT — EXTERNAL EXAM - LEFT EYE: OS_EXAM: NORMAL

## 2024-06-15 ASSESSMENT — SLIT LAMP EXAM - LIDS
COMMENTS: NORMAL
COMMENTS: NORMAL

## 2024-06-15 NOTE — PROGRESS NOTES
Assessment/Plan   Diagnoses and all orders for this visit:  Regular astigmatism of both eyes  Presbyopia  Myopia of both eyes  New spec rx released today per patient request. Ocular health wnl for age OU. Monitor 1 year or sooner prn. Refraction billed today. Discussed proper wear, care, replacement of contact lenses. Gave handout. D/c cl wear and RTC if eyes become red, painful, irritated. Monitor 1 year.   CL eval billed today. $35  Will order lens OD and mail directly to patient. Overall good fit, slight OR.     Subjective visual disturbance  -     OCT, Optic Nerve - OU - Both Eyes  Improving per patient. No evidence of optic nerve edema. Normal (nml) contrast sensitivity. Monitor.

## 2024-06-15 NOTE — Clinical Note
Both eyes (OU) Contact Lens Order Contact Lens Current Rx     Current Contact Lens Rx #2 (Ordered)      Brand Base Curve Diameter Sphere Addl. Specs Dist VA Over-Sphere  Over-Cyl Over-Dist VA   Right Essilor Bitoric 7.76/7.36 9.0 -4.25/-6.00 06/2024 20/20  Sphere  20/20   Comments: Lens OD only ordered.  REF:[id number]     Quantity: 1 Package: RGP Appointment needed? No Medically necessary? No Ship To: Home Additional instructions: Lens ordered direct ship to pt, charges in Epic. Thanks!

## 2024-06-26 ENCOUNTER — APPOINTMENT (OUTPATIENT)
Dept: ALLERGY | Facility: CLINIC | Age: 49
End: 2024-06-26
Payer: COMMERCIAL

## 2024-07-03 ENCOUNTER — APPOINTMENT (OUTPATIENT)
Dept: ALLERGY | Facility: CLINIC | Age: 49
End: 2024-07-03
Payer: COMMERCIAL

## 2024-07-03 DIAGNOSIS — J30.89 ALLERGIC RHINITIS DUE TO OTHER ALLERGIC TRIGGER, UNSPECIFIED SEASONALITY: ICD-10-CM

## 2024-07-03 PROCEDURE — 95117 IMMUNOTHERAPY INJECTIONS: CPT | Performed by: ALLERGY & IMMUNOLOGY

## 2024-07-24 ENCOUNTER — APPOINTMENT (OUTPATIENT)
Dept: ALLERGY | Facility: CLINIC | Age: 49
End: 2024-07-24
Payer: COMMERCIAL

## 2024-08-14 ENCOUNTER — APPOINTMENT (OUTPATIENT)
Dept: ALLERGY | Facility: CLINIC | Age: 49
End: 2024-08-14
Payer: COMMERCIAL

## 2024-08-14 DIAGNOSIS — T63.444A TOXIC EFFECT OF VENOM OF BEES, UNDETERMINED INTENT, INITIAL ENCOUNTER: ICD-10-CM

## 2024-08-14 PROCEDURE — 95117 IMMUNOTHERAPY INJECTIONS: CPT | Performed by: ALLERGY & IMMUNOLOGY

## 2024-09-04 DIAGNOSIS — T63.461A ANAPHYLACTIC REACTION TO WASP STING, ACCIDENTAL OR UNINTENTIONAL, INITIAL ENCOUNTER: ICD-10-CM

## 2024-09-04 DIAGNOSIS — T63.441A ANAPHYLACTIC REACTION TO BEE STING, ACCIDENTAL OR UNINTENTIONAL, INITIAL ENCOUNTER: Primary | ICD-10-CM

## 2024-09-04 DIAGNOSIS — T78.2XXA ANAPHYLACTIC REACTION TO WASP STING, ACCIDENTAL OR UNINTENTIONAL, INITIAL ENCOUNTER: ICD-10-CM

## 2024-09-04 DIAGNOSIS — T78.2XXA ANAPHYLACTIC REACTION TO BEE STING, ACCIDENTAL OR UNINTENTIONAL, INITIAL ENCOUNTER: Primary | ICD-10-CM

## 2024-09-04 PROCEDURE — 95149 ANTIGEN THERAPY SERVICES: CPT | Performed by: ALLERGY & IMMUNOLOGY

## 2024-09-25 ENCOUNTER — APPOINTMENT (OUTPATIENT)
Dept: ALLERGY | Facility: CLINIC | Age: 49
End: 2024-09-25
Payer: COMMERCIAL

## 2024-09-25 DIAGNOSIS — T63.444A TOXIC EFFECT OF VENOM OF BEES, UNDETERMINED INTENT, INITIAL ENCOUNTER: ICD-10-CM

## 2024-09-25 PROCEDURE — 95117 IMMUNOTHERAPY INJECTIONS: CPT | Performed by: ALLERGY & IMMUNOLOGY

## 2024-11-06 ENCOUNTER — APPOINTMENT (OUTPATIENT)
Dept: ALLERGY | Facility: CLINIC | Age: 49
End: 2024-11-06
Payer: COMMERCIAL

## 2024-11-06 DIAGNOSIS — T63.444A TOXIC EFFECT OF VENOM OF BEES, UNDETERMINED INTENT, INITIAL ENCOUNTER: ICD-10-CM

## 2024-11-06 PROCEDURE — 95117 IMMUNOTHERAPY INJECTIONS: CPT | Performed by: ALLERGY & IMMUNOLOGY

## 2024-11-25 ENCOUNTER — LAB (OUTPATIENT)
Dept: LAB | Facility: LAB | Age: 49
End: 2024-11-25
Payer: COMMERCIAL

## 2024-12-18 ENCOUNTER — APPOINTMENT (OUTPATIENT)
Dept: ALLERGY | Facility: CLINIC | Age: 49
End: 2024-12-18
Payer: COMMERCIAL

## 2024-12-18 DIAGNOSIS — T63.444A TOXIC EFFECT OF VENOM OF BEES, UNDETERMINED INTENT, INITIAL ENCOUNTER: ICD-10-CM

## 2024-12-18 PROCEDURE — 95117 IMMUNOTHERAPY INJECTIONS: CPT | Performed by: ALLERGY & IMMUNOLOGY

## 2025-01-29 ENCOUNTER — APPOINTMENT (OUTPATIENT)
Dept: ALLERGY | Facility: CLINIC | Age: 50
End: 2025-01-29
Payer: COMMERCIAL

## 2025-02-05 ENCOUNTER — CLINICAL SUPPORT (OUTPATIENT)
Dept: ALLERGY | Facility: CLINIC | Age: 50
End: 2025-02-05
Payer: COMMERCIAL

## 2025-02-05 DIAGNOSIS — T63.444A TOXIC EFFECT OF VENOM OF BEES, UNDETERMINED INTENT, INITIAL ENCOUNTER: ICD-10-CM

## 2025-02-05 PROCEDURE — 95149 ANTIGEN THERAPY SERVICES: CPT | Performed by: ALLERGY & IMMUNOLOGY

## 2025-02-05 PROCEDURE — 95117 IMMUNOTHERAPY INJECTIONS: CPT | Performed by: ALLERGY & IMMUNOLOGY

## 2025-04-09 ENCOUNTER — APPOINTMENT (OUTPATIENT)
Dept: ALLERGY | Facility: CLINIC | Age: 50
End: 2025-04-09
Payer: COMMERCIAL

## 2025-04-09 VITALS — DIASTOLIC BLOOD PRESSURE: 65 MMHG | WEIGHT: 172 LBS | BODY MASS INDEX: 24.51 KG/M2 | SYSTOLIC BLOOD PRESSURE: 110 MMHG

## 2025-04-09 DIAGNOSIS — T78.2XXA ANAPHYLACTIC REACTION TO BEE STING, ACCIDENTAL OR UNINTENTIONAL, INITIAL ENCOUNTER: Primary | ICD-10-CM

## 2025-04-09 DIAGNOSIS — T78.2XXA ANAPHYLAXIS, INITIAL ENCOUNTER: ICD-10-CM

## 2025-04-09 DIAGNOSIS — J30.81 ALLERGIC RHINITIS DUE TO ANIMAL (CAT) (DOG) HAIR AND DANDER: ICD-10-CM

## 2025-04-09 DIAGNOSIS — T63.441A ANAPHYLACTIC REACTION TO BEE STING, ACCIDENTAL OR UNINTENTIONAL, INITIAL ENCOUNTER: Primary | ICD-10-CM

## 2025-04-09 DIAGNOSIS — T63.451A TOXIC EFFECT OF VENOM OF HORNETS, UNINTENTIONAL, INITIAL ENCOUNTER: ICD-10-CM

## 2025-04-09 PROCEDURE — 99214 OFFICE O/P EST MOD 30 MIN: CPT | Performed by: ALLERGY & IMMUNOLOGY

## 2025-04-09 PROCEDURE — 95117 IMMUNOTHERAPY INJECTIONS: CPT | Performed by: ALLERGY & IMMUNOLOGY

## 2025-04-09 RX ORDER — EPINEPHRINE 2 MG/100UL
1 SPRAY NASAL ONCE AS NEEDED
Qty: 2 EACH | Refills: 5 | Status: SHIPPED | OUTPATIENT
Start: 2025-04-09

## 2025-04-09 NOTE — PROGRESS NOTES
"Subjective   Patient ID:   [id number]   Qi Pearce is a 49 y.o. female who presents for Allergies (Pt presents for  her annual fuv. ).    Chief Complaint   Patient presents with   • Allergies     Pt presents for  her annual fuv.           HPI  This patient is here to evaluate for:  Venom allergy for MV, HB, W    YEARLY RENEWAL OF VIALS:    Immunotherapy started:  3/22/23  This patient is taking allergy shots receiving (ml):  1ml  every:  6wk  Last shot: today, 4/9/25  Current symptoms include: no stings  [facility]  Symptoms on the allergy shots have improved.  Medications used are per the chart.    This patient is not on a beta blocker.  There have been no systemic or delayed allergic reactions to the allergy immunotherapy. The size of the shot reactions are small.       previously reported:     8/2/23  Her reaction to venoms consisted of:  previously reported: This patient had an allergic reaction to venom : End of July  Noticed a lot of bees in the grass while she was with her daughter.  ?honeybee got into hole of her crocks on her feet and stung her.     used to be ok with honeybee stings when she was a .   Although recalls local swelling and \"disoriented\" a few years ago after sting.  And 2020 - same reaction and fatigue when she was stung.      The reaction consisted of: ears ringing, itchy, lip tingling, felt dizzy and disoriented; throat tightness  Timing: immediately after the sting.  Had an epipen bc YJ sting caused hives in high school  And her twin with similar reactions.   But she didn't realize she needed to administer the epipen.     Went to the ED; drove with her daughter, who was reticent to drive with her Mom.  Took benadryl rectally.   At the ED, she did not get epi but got IV medications; no steroids. she was monitored.   In the night, woke sweating, red again.   She wasn't aware and wasn't told to watch for rebound reactions.    10/18/23 - drinking  She was gardening  Stung on " the tongue  Had hot chocolate with spices. She took a drink and got stung.   Had swelling. Flushing.   Went to the ED  Had her epipen but gets disoriented.     Gave her benadryl, prednisone, and did give her epinephrine  Benadryl, famotidine, medrol 3:41pm  Jackson throat closing so Epi 4:19pm     As soon as she got epinephrine, she felt better.     Review of Systems   All other systems reviewed and are negative.        Objective     /65 (BP Location: Left arm, Patient Position: Sitting, BP Cuff Size: Adult)   Wt 78 kg (172 lb)   BMI 24.51 kg/m²      Physical Exam  Constitutional:       Appearance: Normal appearance.   HENT:      Head: Normocephalic and atraumatic.   Eyes:      Extraocular Movements: Extraocular movements intact.      Conjunctiva/sclera: Conjunctivae normal.      Pupils: Pupils are equal, round, and reactive to light.   Pulmonary:      Effort: Pulmonary effort is normal.   Musculoskeletal:      Cervical back: Normal range of motion.   Neurological:      Mental Status: She is alert and oriented to person, place, and time. Mental status is at baseline.   Psychiatric:         Mood and Affect: Mood normal.         Behavior: Behavior normal.         Thought Content: Thought content normal.         Judgment: Judgment normal.          Current Outpatient Medications   Medication Sig Dispense Refill   • cholecalciferol (Vitamin D-3) 25 MCG (1000 UT) capsule Take by mouth.     • cyanocobalamin, vitamin B-12, (VITAMIN B-12 ORAL) Take by mouth.     • docosahexaenoic acid/epa (FISH OIL ORAL) Fish Oil CAPS   Refills: 0       Active     • EPINEPHrine (Epipen) 0.3 mg/0.3 mL injection syringe Inject 0.3 mL (0.3 mg) into the muscle if needed for anaphylaxis. Inject into UPPER, OUTER THIGH. Call 911 after use. 2 each 3   • EPINEPHrine 0.3 mg/0.3 mL injection syringe Inject into upper leg. Call 911 after use.     • quercetin 500 mg capsule Take 1 tablet by mouth 2 times a day.       No current  facility-administered medications for this visit.       Summary of the labs over the past 6 months:    No visits with results within 6 Month(s) from this visit.   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  Anaphylaxis, initial encounter  -     EPINEPHrine (neffy) 2 mg/spray (0.1 mL) spray,non-aerosol; Administer 1 spray into affected nostril(s) 1 time if needed (anaphylaxis) for up to 1 dose.  Toxic effect of venom of hornets, unintentional, initial encounter  -     EPINEPHrine (neffy) 2 mg/spray (0.1 mL) spray,non-aerosol; Administer 1 spray into affected nostril(s) 1 time if needed (anaphylaxis) for up to 1 dose.  Allergic rhinitis due to animal (cat) (dog) hair and dander    Allergic rhinitis and conjunctivitis - doing well and stable.    Will progress to every 8 weeks  Epipen was very expensive    I recommend using a new nasal epinephrine spray called Neffy.  You would take ONE spray in your nostril. Use your right hand to instill the spray straight up into the right nostril; or vice-versa with your left.  It is a ONE time use but you can repeat with the 2nd device if there is no response in 5 minutes.    INSERT the nozzle of the nasal spray device fully into one nostril until the fingers touch the nose; do not angle the nasal spray to the inside septum or outer wall of the nose, as some medication may be lost. PRESS the plunger firmly to  activate. Do not sniff during or after administration.    When to use epinephrine:  If there is a severe allergic reaction such as throat tightness, wheezing, shortness of breath, vomiting, or other signs of anaphylaxis, then epinephrine should be used.      The expiration date for Neffy is longer than the traditional epinephrine pens and it is more tolerant of cold/warm temperatures.     If you use the medication, please seek medical attention (i.e. go to the Emergency room).    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  And also, due to therapeutic failure, as the patient is unable, or refuses to use an epinephrine needle auto-injector.    Follow-up in 1 year so we may re-assess you and develop a more long term plan.  Will recheck venom testing.      Naren Vanessa MD

## 2025-06-04 ENCOUNTER — APPOINTMENT (OUTPATIENT)
Dept: ALLERGY | Facility: CLINIC | Age: 50
End: 2025-06-04
Payer: COMMERCIAL

## 2025-06-04 DIAGNOSIS — T63.441A ANAPHYLACTIC REACTION TO BEE STING, ACCIDENTAL OR UNINTENTIONAL, INITIAL ENCOUNTER: ICD-10-CM

## 2025-06-04 DIAGNOSIS — T78.2XXA ANAPHYLACTIC REACTION TO BEE STING, ACCIDENTAL OR UNINTENTIONAL, INITIAL ENCOUNTER: ICD-10-CM

## 2025-06-04 PROCEDURE — 95149 ANTIGEN THERAPY SERVICES: CPT | Performed by: ALLERGY & IMMUNOLOGY

## 2025-06-04 PROCEDURE — 95117 IMMUNOTHERAPY INJECTIONS: CPT | Performed by: ALLERGY & IMMUNOLOGY

## 2025-06-21 ENCOUNTER — APPOINTMENT (OUTPATIENT)
Dept: OPHTHALMOLOGY | Facility: CLINIC | Age: 50
End: 2025-06-21
Payer: COMMERCIAL

## 2025-08-06 ENCOUNTER — APPOINTMENT (OUTPATIENT)
Dept: ALLERGY | Facility: CLINIC | Age: 50
End: 2025-08-06
Payer: COMMERCIAL

## 2025-08-06 DIAGNOSIS — T63.451A TOXIC EFFECT OF VENOM OF HORNETS, UNINTENTIONAL, INITIAL ENCOUNTER: ICD-10-CM

## 2025-08-06 PROCEDURE — 95149 ANTIGEN THERAPY SERVICES: CPT | Performed by: ALLERGY & IMMUNOLOGY

## 2025-08-06 PROCEDURE — 95117 IMMUNOTHERAPY INJECTIONS: CPT | Performed by: ALLERGY & IMMUNOLOGY

## 2025-10-01 ENCOUNTER — APPOINTMENT (OUTPATIENT)
Dept: ALLERGY | Facility: CLINIC | Age: 50
End: 2025-10-01
Payer: COMMERCIAL